# Patient Record
Sex: FEMALE | Race: WHITE | NOT HISPANIC OR LATINO | Employment: OTHER | URBAN - METROPOLITAN AREA
[De-identification: names, ages, dates, MRNs, and addresses within clinical notes are randomized per-mention and may not be internally consistent; named-entity substitution may affect disease eponyms.]

---

## 2015-12-04 LAB — BMD RECOMMENDATION EXT: NORMAL

## 2017-01-18 ENCOUNTER — HISTORICAL (OUTPATIENT)
Dept: ADMINISTRATIVE | Facility: HOSPITAL | Age: 72
End: 2017-01-18

## 2017-01-25 ENCOUNTER — HISTORICAL (OUTPATIENT)
Dept: ADMINISTRATIVE | Facility: HOSPITAL | Age: 72
End: 2017-01-25

## 2017-07-12 ENCOUNTER — HISTORICAL (OUTPATIENT)
Dept: ADMINISTRATIVE | Facility: HOSPITAL | Age: 72
End: 2017-07-12

## 2017-08-02 ENCOUNTER — HISTORICAL (OUTPATIENT)
Dept: ADMINISTRATIVE | Facility: HOSPITAL | Age: 72
End: 2017-08-02

## 2017-08-02 LAB
BUN SERPL-MCNC: 13 MG/DL (ref 7–18)
CALCIUM SERPL-MCNC: 9.2 MG/DL (ref 8.5–10.1)
CHLORIDE SERPL-SCNC: 107 MMOL/L (ref 98–107)
CHOLEST SERPL-MCNC: 205 MG/DL (ref 0–200)
CHOLEST/HDLC SERPL: 3.2 {RATIO} (ref 0–4)
CO2 SERPL-SCNC: 26 MMOL/L (ref 21–32)
CREAT SERPL-MCNC: 1.04 MG/DL (ref 0.55–1.02)
DEPRECATED CALCIDIOL+CALCIFEROL SERPL-MC: 26.66 NG/ML (ref 30–80)
GLUCOSE SERPL-MCNC: 99 MG/DL (ref 74–106)
HDLC SERPL-MCNC: 65 MG/DL (ref 35–60)
LDLC SERPL CALC-MCNC: 112 MG/DL (ref 0–129)
POTASSIUM SERPL-SCNC: 4.4 MMOL/L (ref 3.5–5.1)
SODIUM SERPL-SCNC: 142 MMOL/L (ref 136–145)
TRIGL SERPL-MCNC: 140 MG/DL (ref 30–150)
VLDLC SERPL CALC-MCNC: 28 MG/DL

## 2018-08-01 ENCOUNTER — HISTORICAL (OUTPATIENT)
Dept: ADMINISTRATIVE | Facility: HOSPITAL | Age: 73
End: 2018-08-01

## 2019-02-27 ENCOUNTER — HISTORICAL (OUTPATIENT)
Dept: RADIOLOGY | Facility: HOSPITAL | Age: 74
End: 2019-02-27

## 2019-03-01 ENCOUNTER — HISTORICAL (OUTPATIENT)
Dept: ADMINISTRATIVE | Facility: HOSPITAL | Age: 74
End: 2019-03-01

## 2019-08-07 ENCOUNTER — HISTORICAL (OUTPATIENT)
Dept: ADMINISTRATIVE | Facility: HOSPITAL | Age: 74
End: 2019-08-07

## 2019-08-07 LAB
ALBUMIN SERPL-MCNC: 3.6 GM/DL (ref 3.4–5)
ALBUMIN/GLOB SERPL: 1.2 {RATIO}
ALP SERPL-CCNC: 107 UNIT/L (ref 38–126)
ALT SERPL-CCNC: 17 UNIT/L (ref 12–78)
AST SERPL-CCNC: 23 UNIT/L (ref 15–37)
BILIRUB SERPL-MCNC: 0.7 MG/DL (ref 0.2–1)
BILIRUBIN DIRECT+TOT PNL SERPL-MCNC: 0.2 MG/DL (ref 0–0.2)
BILIRUBIN DIRECT+TOT PNL SERPL-MCNC: 0.5 MG/DL (ref 0–0.8)
BUN SERPL-MCNC: 13 MG/DL (ref 7–18)
CALCIUM SERPL-MCNC: 8.7 MG/DL (ref 8.5–10.1)
CHLORIDE SERPL-SCNC: 108 MMOL/L (ref 98–107)
CHOLEST SERPL-MCNC: 185 MG/DL (ref 0–200)
CHOLEST/HDLC SERPL: 2.7 {RATIO} (ref 0–4)
CO2 SERPL-SCNC: 27 MMOL/L (ref 21–32)
CREAT SERPL-MCNC: 1.05 MG/DL (ref 0.55–1.02)
DEPRECATED CALCIDIOL+CALCIFEROL SERPL-MC: 27.98 NG/ML (ref 30–80)
GLOBULIN SER-MCNC: 3.1 GM/DL (ref 2.4–3.5)
GLUCOSE SERPL-MCNC: 91 MG/DL (ref 74–106)
HDLC SERPL-MCNC: 69 MG/DL (ref 35–60)
LDLC SERPL CALC-MCNC: 92 MG/DL (ref 0–129)
POTASSIUM SERPL-SCNC: 4.2 MMOL/L (ref 3.5–5.1)
PROT SERPL-MCNC: 6.7 GM/DL (ref 6.4–8.2)
SODIUM SERPL-SCNC: 141 MMOL/L (ref 136–145)
TRIGL SERPL-MCNC: 120 MG/DL (ref 30–150)
VLDLC SERPL CALC-MCNC: 24 MG/DL

## 2019-11-29 ENCOUNTER — HISTORICAL (OUTPATIENT)
Dept: ADMINISTRATIVE | Facility: HOSPITAL | Age: 74
End: 2019-11-29

## 2019-11-29 LAB
TESTOST SERPL-MCNC: 51 NG/DL (ref 14–76)
TSH SERPL-ACNC: 2.19 MIU/L (ref 0.36–3.74)

## 2020-03-17 ENCOUNTER — HISTORICAL (OUTPATIENT)
Dept: LAB | Facility: HOSPITAL | Age: 75
End: 2020-03-17

## 2020-03-20 LAB — FINAL CULTURE: NORMAL

## 2020-03-26 ENCOUNTER — HISTORICAL (OUTPATIENT)
Dept: LAB | Facility: HOSPITAL | Age: 75
End: 2020-03-26

## 2020-03-29 LAB — FINAL CULTURE: NORMAL

## 2020-04-20 LAB — FINAL CULTURE: NORMAL

## 2020-05-20 ENCOUNTER — HISTORICAL (OUTPATIENT)
Dept: ADMINISTRATIVE | Facility: HOSPITAL | Age: 75
End: 2020-05-20

## 2020-06-02 ENCOUNTER — HISTORICAL (OUTPATIENT)
Dept: ADMINISTRATIVE | Facility: HOSPITAL | Age: 75
End: 2020-06-02

## 2020-07-06 ENCOUNTER — HISTORICAL (OUTPATIENT)
Dept: ADMINISTRATIVE | Facility: HOSPITAL | Age: 75
End: 2020-07-06

## 2020-07-09 LAB — FINAL CULTURE: NORMAL

## 2021-04-14 ENCOUNTER — HISTORICAL (OUTPATIENT)
Dept: ADMINISTRATIVE | Facility: HOSPITAL | Age: 76
End: 2021-04-14

## 2021-04-14 LAB
ALBUMIN SERPL-MCNC: 3.8 GM/DL (ref 3.4–4.8)
ALBUMIN/GLOB SERPL: 1.3 RATIO (ref 1.1–2)
ALP SERPL-CCNC: 144 UNIT/L (ref 40–150)
ALT SERPL-CCNC: 10 UNIT/L (ref 0–55)
AST SERPL-CCNC: 22 UNIT/L (ref 5–34)
BILIRUB SERPL-MCNC: 0.6 MG/DL
BILIRUBIN DIRECT+TOT PNL SERPL-MCNC: 0.2 MG/DL (ref 0–0.5)
BILIRUBIN DIRECT+TOT PNL SERPL-MCNC: 0.4 MG/DL (ref 0–0.8)
BUN SERPL-MCNC: 13.9 MG/DL (ref 9.8–20.1)
CALCIUM SERPL-MCNC: 9.1 MG/DL (ref 8.4–10.2)
CHLORIDE SERPL-SCNC: 107 MMOL/L (ref 98–107)
CHOLEST SERPL-MCNC: 205 MG/DL
CHOLEST/HDLC SERPL: 3 {RATIO} (ref 0–5)
CO2 SERPL-SCNC: 24 MMOL/L (ref 23–31)
CREAT SERPL-MCNC: 0.92 MG/DL (ref 0.55–1.02)
DEPRECATED CALCIDIOL+CALCIFEROL SERPL-MC: 38.5 NG/ML (ref 30–80)
GLOBULIN SER-MCNC: 2.9 GM/DL (ref 2.4–3.5)
GLUCOSE SERPL-MCNC: 109 MG/DL (ref 82–115)
HDLC SERPL-MCNC: 61 MG/DL (ref 35–60)
LDLC SERPL CALC-MCNC: 116 MG/DL (ref 50–140)
POTASSIUM SERPL-SCNC: 3.8 MMOL/L (ref 3.5–5.1)
PROT SERPL-MCNC: 6.7 GM/DL (ref 5.8–7.6)
SODIUM SERPL-SCNC: 143 MMOL/L (ref 136–145)
TRIGL SERPL-MCNC: 142 MG/DL (ref 37–140)
VLDLC SERPL CALC-MCNC: 28 MG/DL

## 2021-05-20 ENCOUNTER — HISTORICAL (OUTPATIENT)
Dept: ADMINISTRATIVE | Facility: HOSPITAL | Age: 76
End: 2021-05-20

## 2021-06-21 LAB — FINAL CULTURE: NORMAL

## 2021-07-15 ENCOUNTER — HISTORICAL (OUTPATIENT)
Dept: ADMINISTRATIVE | Facility: HOSPITAL | Age: 76
End: 2021-07-15

## 2021-07-15 LAB
ABS NEUT (OLG): 5.19 X10(3)/MCL (ref 2.1–9.2)
ALBUMIN SERPL-MCNC: 3.3 GM/DL (ref 3.4–4.8)
ALBUMIN/GLOB SERPL: 0.9 RATIO (ref 1.1–2)
ALP SERPL-CCNC: 118 UNIT/L (ref 40–150)
ALT SERPL-CCNC: 15 UNIT/L (ref 0–55)
AST SERPL-CCNC: 25 UNIT/L (ref 5–34)
BASOPHILS # BLD AUTO: 0.1 X10(3)/MCL (ref 0–0.2)
BASOPHILS NFR BLD AUTO: 1 %
BILIRUB SERPL-MCNC: 0.8 MG/DL
BILIRUBIN DIRECT+TOT PNL SERPL-MCNC: 0.3 MG/DL (ref 0–0.5)
BILIRUBIN DIRECT+TOT PNL SERPL-MCNC: 0.5 MG/DL (ref 0–0.8)
BUN SERPL-MCNC: 12 MG/DL (ref 9.8–20.1)
CALCIUM SERPL-MCNC: 9.1 MG/DL (ref 8.4–10.2)
CHLORIDE SERPL-SCNC: 104 MMOL/L (ref 98–107)
CK MB SERPL-MCNC: 1 NG/ML
CK SERPL-CCNC: 149 U/L (ref 29–168)
CO2 SERPL-SCNC: 23 MMOL/L (ref 23–31)
CREAT SERPL-MCNC: 0.88 MG/DL (ref 0.55–1.02)
D DIMER PPP IA.FEU-MCNC: 0.94 MCG/ML FEU (ref 0–0.5)
EOSINOPHIL # BLD AUTO: 0.1 X10(3)/MCL (ref 0–0.9)
EOSINOPHIL NFR BLD AUTO: 1 %
ERYTHROCYTE [DISTWIDTH] IN BLOOD BY AUTOMATED COUNT: 12.8 % (ref 11.5–17)
GLOBULIN SER-MCNC: 3.6 GM/DL (ref 2.4–3.5)
GLUCOSE SERPL-MCNC: 92 MG/DL (ref 82–115)
HCT VFR BLD AUTO: 43.5 % (ref 37–47)
HGB BLD-MCNC: 14.3 GM/DL (ref 12–16)
LYMPHOCYTES # BLD AUTO: 1.7 X10(3)/MCL (ref 0.6–4.6)
LYMPHOCYTES NFR BLD AUTO: 22 %
MAGNESIUM SERPL-MCNC: 2.2 MG/DL (ref 1.6–2.6)
MCH RBC QN AUTO: 32.6 PG (ref 27–31)
MCHC RBC AUTO-ENTMCNC: 32.9 GM/DL (ref 33–36)
MCV RBC AUTO: 99.1 FL (ref 80–94)
MONOCYTES # BLD AUTO: 0.5 X10(3)/MCL (ref 0.1–1.3)
MONOCYTES NFR BLD AUTO: 7 %
NEUTROPHILS # BLD AUTO: 5.19 X10(3)/MCL (ref 2.1–9.2)
NEUTROPHILS NFR BLD AUTO: 69 %
PLATELET # BLD AUTO: 300 X10(3)/MCL (ref 130–400)
PMV BLD AUTO: 11.5 FL (ref 9.4–12.4)
POTASSIUM SERPL-SCNC: 4.3 MMOL/L (ref 3.5–5.1)
PROT SERPL-MCNC: 6.9 GM/DL (ref 5.8–7.6)
RBC # BLD AUTO: 4.39 X10(6)/MCL (ref 4.2–5.4)
SODIUM SERPL-SCNC: 139 MMOL/L (ref 136–145)
TROPONIN I SERPL-MCNC: <0.01 NG/ML (ref 0–0.04)
TSH SERPL-ACNC: 2.03 UIU/ML (ref 0.35–4.94)
WBC # SPEC AUTO: 7.6 X10(3)/MCL (ref 4.5–11.5)

## 2021-07-16 ENCOUNTER — HISTORICAL (OUTPATIENT)
Dept: ADMINISTRATIVE | Facility: HOSPITAL | Age: 76
End: 2021-07-16

## 2021-08-23 ENCOUNTER — HISTORICAL (OUTPATIENT)
Dept: RADIOLOGY | Facility: HOSPITAL | Age: 76
End: 2021-08-23

## 2021-09-02 ENCOUNTER — HISTORICAL (OUTPATIENT)
Dept: ADMINISTRATIVE | Facility: HOSPITAL | Age: 76
End: 2021-09-02

## 2021-09-02 LAB
ABS NEUT (OLG): 7.47 X10(3)/MCL (ref 2.1–9.2)
ALBUMIN SERPL-MCNC: 3.6 GM/DL (ref 3.4–4.8)
ALBUMIN/GLOB SERPL: 1 RATIO (ref 1.1–2)
ALP SERPL-CCNC: 115 UNIT/L (ref 40–150)
ALT SERPL-CCNC: 10 UNIT/L (ref 0–55)
AST SERPL-CCNC: 19 UNIT/L (ref 5–34)
BASOPHILS # BLD AUTO: 0.1 X10(3)/MCL (ref 0–0.2)
BASOPHILS NFR BLD AUTO: 0 %
BILIRUB SERPL-MCNC: 0.7 MG/DL
BILIRUBIN DIRECT+TOT PNL SERPL-MCNC: 0.3 MG/DL (ref 0–0.5)
BILIRUBIN DIRECT+TOT PNL SERPL-MCNC: 0.4 MG/DL (ref 0–0.8)
BUN SERPL-MCNC: 11.8 MG/DL (ref 9.8–20.1)
CALCIUM SERPL-MCNC: 9.3 MG/DL (ref 8.4–10.2)
CHLORIDE SERPL-SCNC: 105 MMOL/L (ref 98–107)
CO2 SERPL-SCNC: 23 MMOL/L (ref 23–31)
CREAT SERPL-MCNC: 0.93 MG/DL (ref 0.55–1.02)
EOSINOPHIL # BLD AUTO: 0.1 X10(3)/MCL (ref 0–0.9)
EOSINOPHIL NFR BLD AUTO: 0 %
ERYTHROCYTE [DISTWIDTH] IN BLOOD BY AUTOMATED COUNT: 12.8 % (ref 11.5–17)
GLOBULIN SER-MCNC: 3.5 GM/DL (ref 2.4–3.5)
GLUCOSE SERPL-MCNC: 109 MG/DL (ref 82–115)
HCT VFR BLD AUTO: 45.1 % (ref 37–47)
HGB BLD-MCNC: 15.2 GM/DL (ref 12–16)
LYMPHOCYTES # BLD AUTO: 2.6 X10(3)/MCL (ref 0.6–4.6)
LYMPHOCYTES NFR BLD AUTO: 23 %
MCH RBC QN AUTO: 33.6 PG (ref 27–31)
MCHC RBC AUTO-ENTMCNC: 33.7 GM/DL (ref 33–36)
MCV RBC AUTO: 99.8 FL (ref 80–94)
MONOCYTES # BLD AUTO: 1 X10(3)/MCL (ref 0.1–1.3)
MONOCYTES NFR BLD AUTO: 9 %
NEUTROPHILS # BLD AUTO: 7.47 X10(3)/MCL (ref 2.1–9.2)
NEUTROPHILS NFR BLD AUTO: 66 %
PLATELET # BLD AUTO: 287 X10(3)/MCL (ref 130–400)
PMV BLD AUTO: 11.9 FL (ref 9.4–12.4)
POTASSIUM SERPL-SCNC: 4.4 MMOL/L (ref 3.5–5.1)
PROT SERPL-MCNC: 7.1 GM/DL (ref 5.8–7.6)
RBC # BLD AUTO: 4.52 X10(6)/MCL (ref 4.2–5.4)
SARS-COV-2 RNA RESP QL NAA+PROBE: NOT DETECTED
SODIUM SERPL-SCNC: 139 MMOL/L (ref 136–145)
WBC # SPEC AUTO: 11.3 X10(3)/MCL (ref 4.5–11.5)

## 2021-12-21 ENCOUNTER — HISTORICAL (OUTPATIENT)
Dept: ADMINISTRATIVE | Facility: HOSPITAL | Age: 76
End: 2021-12-21

## 2021-12-21 LAB
ABS NEUT (OLG): 6.75 X10(3)/MCL (ref 2.1–9.2)
ALBUMIN SERPL-MCNC: 3.4 GM/DL (ref 3.4–4.8)
ALP SERPL-CCNC: 109 UNIT/L (ref 40–150)
ALT SERPL-CCNC: 16 UNIT/L (ref 0–55)
AST SERPL-CCNC: 23 UNIT/L (ref 5–34)
BASOPHILS # BLD AUTO: 0 X10(3)/MCL (ref 0–0.2)
BASOPHILS NFR BLD AUTO: 1 %
BILIRUB SERPL-MCNC: 0.7 MG/DL
BILIRUBIN DIRECT+TOT PNL SERPL-MCNC: 0.3 MG/DL (ref 0–0.5)
BILIRUBIN DIRECT+TOT PNL SERPL-MCNC: 0.4 MG/DL (ref 0–0.8)
EOSINOPHIL # BLD AUTO: 0.1 X10(3)/MCL (ref 0–0.9)
EOSINOPHIL NFR BLD AUTO: 1 %
ERYTHROCYTE [DISTWIDTH] IN BLOOD BY AUTOMATED COUNT: 13.3 % (ref 11.5–17)
HBV CORE IGM SERPL QL IA: NONREACTIVE
HBV SURFACE AB SER-ACNC: 0.31 MIU/ML
HBV SURFACE AB SERPL IA-ACNC: NONREACTIVE M[IU]/ML
HBV SURFACE AG SERPL QL IA: NONREACTIVE
HCT VFR BLD AUTO: 46.6 % (ref 37–47)
HCV AB SERPL QL IA: NONREACTIVE
HGB BLD-MCNC: 15.4 GM/DL (ref 12–16)
LIVER PROFILE INTERP: NORMAL
LYMPHOCYTES # BLD AUTO: 1.3 X10(3)/MCL (ref 0.6–4.6)
LYMPHOCYTES NFR BLD AUTO: 15 %
MCH RBC QN AUTO: 32.4 PG (ref 27–31)
MCHC RBC AUTO-ENTMCNC: 33 GM/DL (ref 33–36)
MCV RBC AUTO: 97.9 FL (ref 80–94)
MONOCYTES # BLD AUTO: 0.7 X10(3)/MCL (ref 0.1–1.3)
MONOCYTES NFR BLD AUTO: 8 %
NEUTROPHILS # BLD AUTO: 6.75 X10(3)/MCL (ref 2.1–9.2)
NEUTROPHILS NFR BLD AUTO: 75 %
PLATELET # BLD AUTO: 364 X10(3)/MCL (ref 130–400)
PMV BLD AUTO: 10.9 FL (ref 9.4–12.4)
PROT SERPL-MCNC: 7.4 GM/DL (ref 5.8–7.6)
RBC # BLD AUTO: 4.76 X10(6)/MCL (ref 4.2–5.4)
WBC # SPEC AUTO: 9 X10(3)/MCL (ref 4.5–11.5)

## 2021-12-29 ENCOUNTER — HISTORICAL (OUTPATIENT)
Dept: ADMINISTRATIVE | Facility: HOSPITAL | Age: 76
End: 2021-12-29

## 2021-12-30 LAB — BCS RECOMMENDATION EXT: NORMAL

## 2022-04-09 ENCOUNTER — HISTORICAL (OUTPATIENT)
Dept: ADMINISTRATIVE | Facility: HOSPITAL | Age: 77
End: 2022-04-09
Payer: MEDICARE

## 2022-04-18 ENCOUNTER — HISTORICAL (OUTPATIENT)
Dept: ADMINISTRATIVE | Facility: HOSPITAL | Age: 77
End: 2022-04-18
Payer: MEDICARE

## 2022-04-18 LAB
ABS NEUT (OLG): 4.33 (ref 2.1–9.2)
ALBUMIN SERPL-MCNC: 3.5 G/DL (ref 3.4–4.8)
ALBUMIN/GLOB SERPL: 1.2 {RATIO} (ref 1.1–2)
ALP SERPL-CCNC: 106 U/L (ref 40–150)
ALT SERPL-CCNC: 14 U/L (ref 0–55)
AST SERPL-CCNC: 20 U/L (ref 5–34)
BASOPHILS # BLD AUTO: 0 10*3/UL (ref 0–0.2)
BASOPHILS NFR BLD AUTO: 0 %
BILIRUB SERPL-MCNC: 0.8 MG/DL
BILIRUBIN DIRECT+TOT PNL SERPL-MCNC: 0.3 (ref 0–0.5)
BILIRUBIN DIRECT+TOT PNL SERPL-MCNC: 0.5 (ref 0–0.8)
BUN SERPL-MCNC: 10.4 MG/DL (ref 9.8–20.1)
CALCIUM SERPL-MCNC: 9.3 MG/DL (ref 8.7–10.5)
CHLORIDE SERPL-SCNC: 108 MMOL/L (ref 98–107)
CHOLEST SERPL-MCNC: 231 MG/DL
CHOLEST/HDLC SERPL: 4 {RATIO} (ref 0–5)
CO2 SERPL-SCNC: 27 MMOL/L (ref 23–31)
CREAT SERPL-MCNC: 0.94 MG/DL (ref 0.55–1.02)
DEPRECATED CALCIDIOL+CALCIFEROL SERPL-MC: 42.5 NG/ML (ref 30–80)
EOSINOPHIL # BLD AUTO: 0.2 10*3/UL (ref 0–0.9)
EOSINOPHIL NFR BLD AUTO: 2 %
ERYTHROCYTE [DISTWIDTH] IN BLOOD BY AUTOMATED COUNT: 13.9 % (ref 11.5–17)
GLOBULIN SER-MCNC: 2.9 G/DL (ref 2.4–3.5)
GLUCOSE SERPL-MCNC: 96 MG/DL (ref 82–115)
HCT VFR BLD AUTO: 44.7 % (ref 37–47)
HDLC SERPL-MCNC: 65 MG/DL (ref 35–60)
HEMOLYSIS INTERF INDEX SERPL-ACNC: 2
HGB BLD-MCNC: 14.9 G/DL (ref 12–16)
ICTERIC INTERF INDEX SERPL-ACNC: 1
IMM GRANULOCYTES # BLD AUTO: 0.02 10*3/UL
IMM GRANULOCYTES NFR BLD AUTO: 0 %
LDLC SERPL CALC-MCNC: 142 MG/DL (ref 50–140)
LIPEMIC INTERF INDEX SERPL-ACNC: 1
LYMPHOCYTES # BLD AUTO: 2.3 10*3/UL (ref 0.6–4.6)
LYMPHOCYTES NFR BLD AUTO: 32 %
MANUAL DIFF? (OHS): NO
MCH RBC QN AUTO: 33.8 PG (ref 27–31)
MCHC RBC AUTO-ENTMCNC: 33.3 G/DL (ref 33–36)
MCV RBC AUTO: 101.4 FL (ref 80–94)
MONOCYTES # BLD AUTO: 0.5 10*3/UL (ref 0.1–1.3)
MONOCYTES NFR BLD AUTO: 6 %
NEUTROPHILS # BLD AUTO: 4.33 10*3/UL (ref 2.1–9.2)
NEUTROPHILS NFR BLD AUTO: 59 %
PLATELET # BLD AUTO: 230 10*3/UL (ref 130–400)
PMV BLD AUTO: 11.2 FL (ref 9.4–12.4)
POTASSIUM SERPL-SCNC: 4.4 MMOL/L (ref 3.5–5.1)
PROT SERPL-MCNC: 6.4 G/DL (ref 5.8–7.6)
RBC # BLD AUTO: 4.41 10*6/UL (ref 4.2–5.4)
SODIUM SERPL-SCNC: 145 MMOL/L (ref 136–145)
TRIGL SERPL-MCNC: 122 MG/DL (ref 37–140)
VLDLC SERPL CALC-MCNC: 24 MG/DL
WBC # SPEC AUTO: 7.4 10*3/UL (ref 4.5–11.5)

## 2022-04-25 VITALS
DIASTOLIC BLOOD PRESSURE: 56 MMHG | OXYGEN SATURATION: 98 % | SYSTOLIC BLOOD PRESSURE: 118 MMHG | BODY MASS INDEX: 32.76 KG/M2 | WEIGHT: 178 LBS | HEIGHT: 62 IN

## 2022-05-17 ENCOUNTER — TELEPHONE (OUTPATIENT)
Dept: INTERNAL MEDICINE | Facility: CLINIC | Age: 77
End: 2022-05-17
Payer: MEDICARE

## 2022-05-17 NOTE — TELEPHONE ENCOUNTER
----- Message from Cheryl Lejeune sent at 5/17/2022 11:39 AM CDT -----  Regarding: RE: helen NANCE, 5/23 @100  Pt informed of OV and check in protocol.  She verbalized understanding.  ----- Message -----  From: Eleonora Medellin LPN  Sent: 5/16/2022   4:21 PM CDT  To: Shelley Lejeune  Subject: helen NANCE, 5/23 @100                              1. Are there any outstanding tasks in the patient's chart? no    2. Is there any documentation of task in the chart? no    3. Has patient been seen in an ER, urgent care clinic, or been admitted since last visit?    4. Has patient seen any other healthcare providers (doctors) since last visit?    5. Has patient had any bloodwork or x-rays done since last vist?

## 2022-05-20 PROBLEM — K63.5 POLYP OF COLON: Status: ACTIVE | Noted: 2022-05-20

## 2022-05-20 PROBLEM — E55.9 VITAMIN D DEFICIENCY: Status: ACTIVE | Noted: 2022-05-20

## 2022-05-20 PROBLEM — E66.9 OBESITY: Status: ACTIVE | Noted: 2022-05-20

## 2022-05-20 PROBLEM — C50.919 MALIGNANT NEOPLASM OF BREAST: Status: ACTIVE | Noted: 2022-05-20

## 2022-05-20 PROBLEM — M81.0 OSTEOPOROSIS: Status: ACTIVE | Noted: 2022-05-20

## 2022-05-20 PROBLEM — I10 HYPERTENSION: Status: ACTIVE | Noted: 2022-05-20

## 2022-05-23 ENCOUNTER — OFFICE VISIT (OUTPATIENT)
Dept: INTERNAL MEDICINE | Facility: CLINIC | Age: 77
End: 2022-05-23
Payer: MEDICARE

## 2022-05-23 VITALS
HEART RATE: 99 BPM | OXYGEN SATURATION: 98 % | TEMPERATURE: 98 F | DIASTOLIC BLOOD PRESSURE: 72 MMHG | RESPIRATION RATE: 18 BRPM | HEIGHT: 61 IN | WEIGHT: 176 LBS | BODY MASS INDEX: 33.23 KG/M2 | SYSTOLIC BLOOD PRESSURE: 122 MMHG

## 2022-05-23 DIAGNOSIS — I10 PRIMARY HYPERTENSION: Primary | ICD-10-CM

## 2022-05-23 PROCEDURE — 99213 PR OFFICE/OUTPT VISIT, EST, LEVL III, 20-29 MIN: ICD-10-PCS | Mod: ,,, | Performed by: INTERNAL MEDICINE

## 2022-05-23 PROCEDURE — 99213 OFFICE O/P EST LOW 20 MIN: CPT | Mod: ,,, | Performed by: INTERNAL MEDICINE

## 2022-05-23 RX ORDER — LISINOPRIL 10 MG/1
10 TABLET ORAL DAILY
COMMUNITY
Start: 2022-04-20 | End: 2022-08-15

## 2022-05-23 RX ORDER — USTEKINUMAB 45 MG/.5ML
INJECTION, SOLUTION SUBCUTANEOUS
COMMUNITY
Start: 2022-03-07

## 2022-05-23 NOTE — PROGRESS NOTES
Subjective:      Chief Complaint: 1 mo f/u      HPI:  She is here for f/u htn.  BP is usually 117-130/70-90, but rarely.  Diastolic is usually in the 70s.  She is tolerating the lisinopril without issue.  She has occ palpitations, but nothing unusual and its brief.    She is on the Stelara, which is keeping the rash under control.    Problem Noted   Hypertension 5/20/2022   Breast Cancer 5/20/2022   Obesity 5/20/2022   Osteoporosis 5/20/2022    had bone density 12/2015 that showed osteoporosis but patient opted not to treathad seen Dr. Pabon and was on injections for the osteoporosis in the past.  She thinks the last bone density was in 2012.     Colon Polyps 5/20/2022   Vitamin D Deficiency 5/20/2022        The patient's Health Maintenance was reviewed and the following appears to be due:   Health Maintenance Due   Topic Date Due    COVID-19 Vaccine (1) Never done    Pneumococcal Vaccines (Age 65+) (1 - PCV) Never done    Shingles Vaccine (1 of 2) Never done    DEXA Scan  Never done       Past Medical History:  History reviewed. No pertinent past medical history.  Past Surgical History:   Procedure Laterality Date    BREAST LUMPECTOMY      KNEE ARTHROSCOPY      LIPOMA RESECTION       Review of patient's allergies indicates:   Allergen Reactions    Hydrochlorothiazide Rash    Ketoconazole Rash     Current Outpatient Medications on File Prior to Visit   Medication Sig Dispense Refill    lisinopriL 10 MG tablet Take 10 mg by mouth once daily.      STELARA 45 mg/0.5 mL Syrg syringe every 3 (three) months.       No current facility-administered medications on file prior to visit.     Social History     Socioeconomic History    Marital status:    Tobacco Use    Smoking status: Never Smoker    Smokeless tobacco: Never Used   Substance and Sexual Activity    Alcohol use: Not Currently    Drug use: Never    Sexual activity: Not Currently     Family History   Problem Relation Age of Onset    Liver  "cancer Mother     Heart disease Father     Hypertension Sister        Review of Systems    Objective:   /72 (BP Location: Right arm, Patient Position: Sitting)   Pulse 99   Temp 98.2 °F (36.8 °C)   Resp 18   Ht 5' 1" (1.549 m)   Wt 79.8 kg (176 lb)   SpO2 98%   BMI 33.25 kg/m²     Physical Exam  Vitals reviewed.   Constitutional:       General: She is not in acute distress.     Appearance: Normal appearance. She is not ill-appearing or diaphoretic.   HENT:      Head: Normocephalic and atraumatic.   Pulmonary:      Effort: Pulmonary effort is normal.   Skin:     General: Skin is warm and dry.   Neurological:      General: No focal deficit present.      Mental Status: She is alert.   Psychiatric:         Mood and Affect: Mood normal.         Behavior: Behavior normal.         Thought Content: Thought content normal.         Judgment: Judgment normal.       Assessment and Plan:   1. Primary hypertension  Controlled.  Cont lisinopril.        "

## 2022-11-14 ENCOUNTER — LAB VISIT (OUTPATIENT)
Dept: LAB | Facility: HOSPITAL | Age: 77
End: 2022-11-14
Attending: PHYSICIAN ASSISTANT
Payer: MEDICARE

## 2022-11-14 DIAGNOSIS — Z79.899 ENCOUNTER FOR LONG-TERM (CURRENT) USE OF OTHER MEDICATIONS: Primary | ICD-10-CM

## 2022-11-14 LAB
ALBUMIN SERPL-MCNC: 3.7 GM/DL (ref 3.4–4.8)
ALBUMIN/GLOB SERPL: 1.3 RATIO (ref 1.1–2)
ALP SERPL-CCNC: 103 UNIT/L (ref 40–150)
ALT SERPL-CCNC: 12 UNIT/L (ref 0–55)
AST SERPL-CCNC: 21 UNIT/L (ref 5–34)
BASOPHILS # BLD AUTO: 0.05 X10(3)/MCL (ref 0–0.2)
BASOPHILS NFR BLD AUTO: 0.5 %
BILIRUBIN DIRECT+TOT PNL SERPL-MCNC: 0.4 MG/DL
BUN SERPL-MCNC: 10.9 MG/DL (ref 9.8–20.1)
CALCIUM SERPL-MCNC: 9.3 MG/DL (ref 8.4–10.2)
CHLORIDE SERPL-SCNC: 105 MMOL/L (ref 98–107)
CO2 SERPL-SCNC: 26 MMOL/L (ref 23–31)
CREAT SERPL-MCNC: 1.18 MG/DL (ref 0.55–1.02)
EOSINOPHIL # BLD AUTO: 0.03 X10(3)/MCL (ref 0–0.9)
EOSINOPHIL NFR BLD AUTO: 0.3 %
ERYTHROCYTE [DISTWIDTH] IN BLOOD BY AUTOMATED COUNT: 13 % (ref 11.5–17)
GFR SERPLBLD CREATININE-BSD FMLA CKD-EPI: 47 MLS/MIN/1.73/M2
GLOBULIN SER-MCNC: 2.9 GM/DL (ref 2.4–3.5)
GLUCOSE SERPL-MCNC: 120 MG/DL (ref 82–115)
HCT VFR BLD AUTO: 45.3 % (ref 37–47)
HGB BLD-MCNC: 14.9 GM/DL (ref 12–16)
IMM GRANULOCYTES # BLD AUTO: 0.04 X10(3)/MCL (ref 0–0.04)
IMM GRANULOCYTES NFR BLD AUTO: 0.4 %
LYMPHOCYTES # BLD AUTO: 1.42 X10(3)/MCL (ref 0.6–4.6)
LYMPHOCYTES NFR BLD AUTO: 15.4 %
MCH RBC QN AUTO: 33.3 PG (ref 27–31)
MCHC RBC AUTO-ENTMCNC: 32.9 MG/DL (ref 33–36)
MCV RBC AUTO: 101.1 FL (ref 80–94)
MONOCYTES # BLD AUTO: 0.49 X10(3)/MCL (ref 0.1–1.3)
MONOCYTES NFR BLD AUTO: 5.3 %
NEUTROPHILS # BLD AUTO: 7.2 X10(3)/MCL (ref 2.1–9.2)
NEUTROPHILS NFR BLD AUTO: 78.1 %
NRBC BLD AUTO-RTO: 0 %
PLATELET # BLD AUTO: 262 X10(3)/MCL (ref 130–400)
PMV BLD AUTO: 11.4 FL (ref 7.4–10.4)
POTASSIUM SERPL-SCNC: 4 MMOL/L (ref 3.5–5.1)
PROT SERPL-MCNC: 6.6 GM/DL (ref 5.8–7.6)
RBC # BLD AUTO: 4.48 X10(6)/MCL (ref 4.2–5.4)
SODIUM SERPL-SCNC: 139 MMOL/L (ref 136–145)
WBC # SPEC AUTO: 9.3 X10(3)/MCL (ref 4.5–11.5)

## 2022-11-14 PROCEDURE — 85025 COMPLETE CBC W/AUTO DIFF WBC: CPT

## 2022-11-14 PROCEDURE — 80053 COMPREHEN METABOLIC PANEL: CPT

## 2022-11-14 PROCEDURE — 86480 TB TEST CELL IMMUN MEASURE: CPT

## 2022-11-14 PROCEDURE — 36415 COLL VENOUS BLD VENIPUNCTURE: CPT

## 2022-11-15 ENCOUNTER — DOCUMENTATION ONLY (OUTPATIENT)
Dept: INTERNAL MEDICINE | Facility: CLINIC | Age: 77
End: 2022-11-15
Payer: MEDICARE

## 2022-11-15 ENCOUNTER — TELEPHONE (OUTPATIENT)
Dept: INTERNAL MEDICINE | Facility: CLINIC | Age: 77
End: 2022-11-15
Payer: MEDICARE

## 2022-11-15 NOTE — TELEPHONE ENCOUNTER
----- Message from Belle Salazar LPN sent at 11/15/2022  1:47 PM CST -----  Regarding: GODWIN Manrique 11/22/22 @10:40a  Are there any outstanding tasks in the chart? no    Is there any documentation of tasks? no    Has patient seen another physician, been to the ER, UCC, or admitted to hospital since last visit?    Has the patient done blood work or imaging since last visit?

## 2022-11-16 LAB
GAMMA INTERFERON BACKGROUND BLD IA-ACNC: 0.02 IU/ML
M TB IFN-G BLD-IMP: NEGATIVE
M TB IFN-G CD4+ BCKGRND COR BLD-ACNC: 0 IU/ML
M TB IFN-G CD4+CD8+ BCKGRND COR BLD-ACNC: 0.01 IU/ML
MITOGEN IGNF BCKGRD COR BLD-ACNC: 9.41 IU/ML

## 2022-12-14 ENCOUNTER — PATIENT OUTREACH (OUTPATIENT)
Dept: ADMINISTRATIVE | Facility: CLINIC | Age: 77
End: 2022-12-14
Payer: MEDICARE

## 2022-12-14 NOTE — PROGRESS NOTES
C3 nurse attempted to contact patient for a TCC post hospital discharge follow-up call. The patient declined call at this time.

## 2022-12-27 ENCOUNTER — TELEPHONE (OUTPATIENT)
Dept: INTERNAL MEDICINE | Facility: CLINIC | Age: 77
End: 2022-12-27
Payer: MEDICARE

## 2022-12-27 DIAGNOSIS — Z12.31 ENCOUNTER FOR SCREENING MAMMOGRAM FOR BREAST CANCER: Primary | ICD-10-CM

## 2022-12-27 NOTE — TELEPHONE ENCOUNTER
MMG order entered and faxed to [facility]. Pt notified and verbalized understanding.

## 2023-01-03 LAB — BCS RECOMMENDATION EXT: NORMAL

## 2023-01-05 ENCOUNTER — DOCUMENTATION ONLY (OUTPATIENT)
Dept: INTERNAL MEDICINE | Facility: CLINIC | Age: 78
End: 2023-01-05
Payer: MEDICARE

## 2023-01-19 ENCOUNTER — OFFICE VISIT (OUTPATIENT)
Dept: INTERNAL MEDICINE | Facility: CLINIC | Age: 78
End: 2023-01-19
Payer: MEDICARE

## 2023-01-19 ENCOUNTER — HOSPITAL ENCOUNTER (OUTPATIENT)
Dept: RADIOLOGY | Facility: HOSPITAL | Age: 78
Discharge: HOME | End: 2023-01-19
Attending: INTERNAL MEDICINE
Payer: MEDICARE

## 2023-01-19 VITALS
OXYGEN SATURATION: 98 % | HEART RATE: 101 BPM | DIASTOLIC BLOOD PRESSURE: 82 MMHG | WEIGHT: 174 LBS | TEMPERATURE: 99 F | RESPIRATION RATE: 20 BRPM | SYSTOLIC BLOOD PRESSURE: 122 MMHG | BODY MASS INDEX: 32.85 KG/M2 | HEIGHT: 61 IN

## 2023-01-19 DIAGNOSIS — N30.01 ACUTE CYSTITIS WITH HEMATURIA: ICD-10-CM

## 2023-01-19 DIAGNOSIS — R09.89 RALES 1/2 WAY UP POSTERIOR CHEST WALL ON RIGHT SIDE: ICD-10-CM

## 2023-01-19 DIAGNOSIS — R07.81 PLEURITIC CHEST PAIN: ICD-10-CM

## 2023-01-19 DIAGNOSIS — R07.81 PLEURITIC CHEST PAIN: Primary | ICD-10-CM

## 2023-01-19 PROBLEM — K35.30 ACUTE APPENDICITIS WITH LOCALIZED PERITONITIS, WITHOUT PERFORATION, ABSCESS, OR GANGRENE: Status: ACTIVE | Noted: 2022-12-10

## 2023-01-19 PROBLEM — N39.0 URINARY TRACT INFECTION WITH HEMATURIA: Status: ACTIVE | Noted: 2023-01-13

## 2023-01-19 PROBLEM — R31.9 URINARY TRACT INFECTION WITH HEMATURIA: Status: ACTIVE | Noted: 2023-01-13

## 2023-01-19 PROCEDURE — 71046 X-RAY EXAM CHEST 2 VIEWS: CPT | Mod: TC

## 2023-01-19 PROCEDURE — 99214 PR OFFICE/OUTPT VISIT, EST, LEVL IV, 30-39 MIN: ICD-10-PCS | Mod: ,,, | Performed by: INTERNAL MEDICINE

## 2023-01-19 PROCEDURE — 99214 OFFICE O/P EST MOD 30 MIN: CPT | Mod: ,,, | Performed by: INTERNAL MEDICINE

## 2023-01-19 NOTE — PROGRESS NOTES
Subjective:      Chief Complaint: Shortness of Breath (Says she can hardly breathe, this feels like something she had a year ago- had nodule on L lung.Is having pain on R side right now. Started 3-4days ago /Had appendectomy on 12/10)      HPI:She is here with c/o trouble breathing.  She had an appendectomy done on December 11th at [facility].  She was discharged the following day.  She has some pain at the base of her rt lung posteriorly and rotates around to the RUQ.  It started 4-5 days ago.  Its worse with moving.  On Jan. 13th, she went to urgent care and was treated for a UTI.  She finishes the abx today.  She is taking nitrofurantoin that was sensitive to what grew in the culture.  She was having some pain and burning with urination.  Sx are better but still doesn't feel right.  No vomiting but she does have some nausea.  No fevers thought she has some cold intolerance.  She has sob when she talks.  She has a sl cough if she moves.  No leg swelling.  There is some pleuritic pain with deep breath.  She describes the pain as a soreness.    Problem Noted   Pleuritic Chest Pain 1/19/2023   Urinary Tract Infection With Hematuria 1/13/2023   Acute Appendicitis With Localized Peritonitis, Without Perforation, Abscess, Or Gangrene 12/10/2022    Formatting of this note might be different from the original.  Added automatically from request for surgery [id number]    Last Assessment & Plan:   Formatting of this note might be different from the original.  1.5 weeks s/p lap appy    - pathology reviewed- c/w diagnosis  - RUE ultrasound ordered for arm swelling  - will call with results  - RTC prn     Hypertension 5/20/2022   Breast Cancer 5/20/2022   Obesity 5/20/2022   Osteoporosis 5/20/2022    had bone density 12/2015 that showed osteoporosis but patient opted not to treathad seen Dr. Pabon and was on injections for the osteoporosis in the past.  She thinks the last bone density was in 2012.     Colon Polyps 5/20/2022  "  Vitamin D Deficiency 5/20/2022        The patient's Health Maintenance was reviewed and the following appears to be due:   Health Maintenance Due   Topic Date Due    COVID-19 Vaccine (1) Never done    Pneumococcal Vaccines (Age 65+) (1 - PCV) Never done    Shingles Vaccine (1 of 2) Never done    DEXA Scan  12/04/2018    Influenza Vaccine (1) Never done       Past Medical History:  History reviewed. No pertinent past medical history.  Review of patient's allergies indicates:   Allergen Reactions    Hydrochlorothiazide Rash    Ketoconazole Rash     Current Outpatient Medications on File Prior to Visit   Medication Sig Dispense Refill    lisinopriL 10 MG tablet TAKE 1 TABLET BY MOUTH EVERY DAY 90 tablet 1    STELARA 45 mg/0.5 mL Syrg syringe every 3 (three) months.       No current facility-administered medications on file prior to visit.       Review of Systems    Objective:   /82 (BP Location: Right arm, Patient Position: Sitting, BP Method: Large (Manual))   Pulse 101   Temp 98.9 °F (37.2 °C)   Resp 20   Ht 5' 0.98" (1.549 m)   Wt 78.9 kg (174 lb)   SpO2 98%   BMI 32.89 kg/m²     Physical Exam  Vitals reviewed.   Constitutional:       General: She is not in acute distress.     Appearance: Normal appearance. She is not ill-appearing or diaphoretic.   HENT:      Head: Normocephalic and atraumatic.   Cardiovascular:      Rate and Rhythm: Normal rate and regular rhythm.      Heart sounds: Normal heart sounds.   Pulmonary:      Effort: Pulmonary effort is normal.      Breath sounds: Rales (rt base) present.   Musculoskeletal:      Comments: Some flank ttp   Skin:     General: Skin is warm and dry.   Neurological:      General: No focal deficit present.      Mental Status: She is alert.   Psychiatric:         Mood and Affect: Mood normal.         Behavior: Behavior normal.         Thought Content: Thought content normal.         Judgment: Judgment normal.     Assessment and Plan:     Pleuritic chest " pain  Sounds like she could have a pneumonia of the RLL.  Will get labs and cxr to further evaluate.    Urinary tract infection with hematuria  Repeat u/a      No follow-ups on file.       [unfilled]  Orders Placed This Encounter   Procedures    X-Ray Chest PA And Lateral     Standing Status:   Future     Standing Expiration Date:   1/19/2024     Order Specific Question:   May the Radiologist modify the order per protocol to meet the clinical needs of the patient?     Answer:   Yes     Order Specific Question:   Release to patient     Answer:   Immediate    CBC Auto Differential     Standing Status:   Future     Standing Expiration Date:   3/19/2024    Comprehensive Metabolic Panel     Standing Status:   Future     Standing Expiration Date:   3/19/2024    Urinalysis, Reflex to Urine Culture     Standing Status:   Future     Standing Expiration Date:   2/19/2023     Order Specific Question:   Specimen Source     Answer:   Urine    D-Dimer, Quantitative     Standing Status:   Future     Standing Expiration Date:   3/19/2024

## 2023-01-20 ENCOUNTER — TELEPHONE (OUTPATIENT)
Dept: INTERNAL MEDICINE | Facility: CLINIC | Age: 78
End: 2023-01-20
Payer: MEDICARE

## 2023-01-20 ENCOUNTER — HOSPITAL ENCOUNTER (OUTPATIENT)
Facility: HOSPITAL | Age: 78
Discharge: HOME | End: 2023-01-21
Attending: EMERGENCY MEDICINE | Admitting: INTERNAL MEDICINE
Payer: MEDICARE

## 2023-01-20 DIAGNOSIS — I26.99 PULMONARY EMBOLISM AND INFARCTION: ICD-10-CM

## 2023-01-20 DIAGNOSIS — I10 BENIGN ESSENTIAL HTN: Primary | ICD-10-CM

## 2023-01-20 DIAGNOSIS — R06.02 SOB (SHORTNESS OF BREATH): ICD-10-CM

## 2023-01-20 DIAGNOSIS — I26.99 PULMONARY EMBOLISM: ICD-10-CM

## 2023-01-20 DIAGNOSIS — R07.81 PLEURITIC CHEST PAIN: ICD-10-CM

## 2023-01-20 DIAGNOSIS — R07.81 PLEURODYNIA: Primary | ICD-10-CM

## 2023-01-20 PROBLEM — E87.1 HYPONATREMIA: Status: ACTIVE | Noted: 2023-01-20

## 2023-01-20 LAB
ALBUMIN SERPL-MCNC: 3.2 G/DL (ref 3.4–4.8)
ALBUMIN/GLOB SERPL: 0.8 RATIO (ref 1.1–2)
ALP SERPL-CCNC: 114 UNIT/L (ref 40–150)
ALT SERPL-CCNC: 21 UNIT/L (ref 0–55)
AST SERPL-CCNC: 30 UNIT/L (ref 5–34)
BASOPHILS # BLD AUTO: 0.03 X10(3)/MCL (ref 0–0.2)
BASOPHILS NFR BLD AUTO: 0.3 %
BILIRUBIN DIRECT+TOT PNL SERPL-MCNC: 1.1 MG/DL
BNP BLD-MCNC: 20.1 PG/ML
BUN SERPL-MCNC: 17.5 MG/DL (ref 9.8–20.1)
CALCIUM SERPL-MCNC: 9.2 MG/DL (ref 8.4–10.2)
CHLORIDE SERPL-SCNC: 101 MMOL/L (ref 98–107)
CO2 SERPL-SCNC: 17 MMOL/L (ref 23–31)
CREAT SERPL-MCNC: 1.14 MG/DL (ref 0.55–1.02)
EOSINOPHIL # BLD AUTO: 0.01 X10(3)/MCL (ref 0–0.9)
EOSINOPHIL NFR BLD AUTO: 0.1 %
ERYTHROCYTE [DISTWIDTH] IN BLOOD BY AUTOMATED COUNT: 13 % (ref 11.5–17)
GFR SERPLBLD CREATININE-BSD FMLA CKD-EPI: 49 MLS/MIN/1.73/M2
GLOBULIN SER-MCNC: 3.9 GM/DL (ref 2.4–3.5)
GLUCOSE SERPL-MCNC: 124 MG/DL (ref 82–115)
HCT VFR BLD AUTO: 38.5 % (ref 37–47)
HGB BLD-MCNC: 13.1 GM/DL (ref 12–16)
IMM GRANULOCYTES # BLD AUTO: 0.04 X10(3)/MCL (ref 0–0.04)
IMM GRANULOCYTES NFR BLD AUTO: 0.4 %
INR BLD: 1.2 (ref 0–1.3)
LYMPHOCYTES # BLD AUTO: 1.11 X10(3)/MCL (ref 0.6–4.6)
LYMPHOCYTES NFR BLD AUTO: 10 %
MCH RBC QN AUTO: 32.8 PG
MCHC RBC AUTO-ENTMCNC: 34 MG/DL (ref 33–36)
MCV RBC AUTO: 96.5 FL (ref 80–94)
MONOCYTES # BLD AUTO: 1.24 X10(3)/MCL (ref 0.1–1.3)
MONOCYTES NFR BLD AUTO: 11.2 %
NEUTROPHILS # BLD AUTO: 8.68 X10(3)/MCL (ref 2.1–9.2)
NEUTROPHILS NFR BLD AUTO: 78 %
NRBC BLD AUTO-RTO: 0 %
PLATELET # BLD AUTO: 179 X10(3)/MCL (ref 130–400)
PMV BLD AUTO: 11.6 FL (ref 7.4–10.4)
POTASSIUM SERPL-SCNC: 4.2 MMOL/L (ref 3.5–5.1)
PROT SERPL-MCNC: 7.1 GM/DL (ref 5.8–7.6)
PROTHROMBIN TIME: 15.1 SECONDS (ref 12.5–14.5)
RBC # BLD AUTO: 3.99 X10(6)/MCL (ref 4.2–5.4)
SODIUM SERPL-SCNC: 130 MMOL/L (ref 136–145)
TROPONIN I SERPL-MCNC: <0.01 NG/ML (ref 0–0.04)
WBC # SPEC AUTO: 11.1 X10(3)/MCL (ref 4.5–11.5)

## 2023-01-20 PROCEDURE — 83880 ASSAY OF NATRIURETIC PEPTIDE: CPT | Performed by: NURSE PRACTITIONER

## 2023-01-20 PROCEDURE — 25000003 PHARM REV CODE 250: Performed by: EMERGENCY MEDICINE

## 2023-01-20 PROCEDURE — 93005 ELECTROCARDIOGRAM TRACING: CPT

## 2023-01-20 PROCEDURE — 96372 THER/PROPH/DIAG INJ SC/IM: CPT | Performed by: EMERGENCY MEDICINE

## 2023-01-20 PROCEDURE — G0378 HOSPITAL OBSERVATION PER HR: HCPCS

## 2023-01-20 PROCEDURE — 85610 PROTHROMBIN TIME: CPT | Performed by: NURSE PRACTITIONER

## 2023-01-20 PROCEDURE — 63600175 PHARM REV CODE 636 W HCPCS: Performed by: EMERGENCY MEDICINE

## 2023-01-20 PROCEDURE — 84484 ASSAY OF TROPONIN QUANT: CPT | Performed by: NURSE PRACTITIONER

## 2023-01-20 PROCEDURE — 85025 COMPLETE CBC W/AUTO DIFF WBC: CPT | Performed by: NURSE PRACTITIONER

## 2023-01-20 PROCEDURE — 80053 COMPREHEN METABOLIC PANEL: CPT | Performed by: NURSE PRACTITIONER

## 2023-01-20 PROCEDURE — 25000003 PHARM REV CODE 250: Performed by: INTERNAL MEDICINE

## 2023-01-20 PROCEDURE — 96374 THER/PROPH/DIAG INJ IV PUSH: CPT

## 2023-01-20 PROCEDURE — 99285 EMERGENCY DEPT VISIT HI MDM: CPT | Mod: 25

## 2023-01-20 RX ORDER — POLYETHYLENE GLYCOL 3350 17 G/17G
17 POWDER, FOR SOLUTION ORAL DAILY
Status: DISCONTINUED | OUTPATIENT
Start: 2023-01-21 | End: 2023-01-21 | Stop reason: HOSPADM

## 2023-01-20 RX ORDER — TALC
6 POWDER (GRAM) TOPICAL NIGHTLY PRN
Status: DISCONTINUED | OUTPATIENT
Start: 2023-01-20 | End: 2023-01-21 | Stop reason: HOSPADM

## 2023-01-20 RX ORDER — ENOXAPARIN SODIUM 100 MG/ML
1 INJECTION SUBCUTANEOUS EVERY 12 HOURS
Status: DISCONTINUED | OUTPATIENT
Start: 2023-01-20 | End: 2023-01-21

## 2023-01-20 RX ORDER — HYDROCODONE BITARTRATE AND ACETAMINOPHEN 5; 325 MG/1; MG/1
1 TABLET ORAL EVERY 4 HOURS PRN
Status: DISCONTINUED | OUTPATIENT
Start: 2023-01-20 | End: 2023-01-21 | Stop reason: HOSPADM

## 2023-01-20 RX ORDER — ACETAMINOPHEN 500 MG
1000 TABLET ORAL EVERY 6 HOURS PRN
Status: DISCONTINUED | OUTPATIENT
Start: 2023-01-20 | End: 2023-01-21 | Stop reason: HOSPADM

## 2023-01-20 RX ORDER — FAMOTIDINE 20 MG/1
20 TABLET, FILM COATED ORAL DAILY
Status: DISCONTINUED | OUTPATIENT
Start: 2023-01-21 | End: 2023-01-21 | Stop reason: HOSPADM

## 2023-01-20 RX ORDER — DOXYCYCLINE 100 MG/1
100 CAPSULE ORAL EVERY 12 HOURS
Qty: 14 CAPSULE | Refills: 0 | OUTPATIENT
Start: 2023-01-20 | End: 2023-01-21

## 2023-01-20 RX ORDER — LISINOPRIL 10 MG/1
10 TABLET ORAL DAILY
Status: DISCONTINUED | OUTPATIENT
Start: 2023-01-21 | End: 2023-01-21 | Stop reason: HOSPADM

## 2023-01-20 RX ORDER — SODIUM CHLORIDE 0.9 % (FLUSH) 0.9 %
10 SYRINGE (ML) INJECTION
Status: DISCONTINUED | OUTPATIENT
Start: 2023-01-20 | End: 2023-01-21 | Stop reason: HOSPADM

## 2023-01-20 RX ORDER — ACETAMINOPHEN 325 MG/1
650 TABLET ORAL EVERY 8 HOURS PRN
Status: DISCONTINUED | OUTPATIENT
Start: 2023-01-20 | End: 2023-01-21 | Stop reason: HOSPADM

## 2023-01-20 RX ORDER — ONDANSETRON 2 MG/ML
4 INJECTION INTRAMUSCULAR; INTRAVENOUS EVERY 8 HOURS PRN
Status: DISCONTINUED | OUTPATIENT
Start: 2023-01-20 | End: 2023-01-21 | Stop reason: HOSPADM

## 2023-01-20 RX ADMIN — HYDROCODONE BITARTRATE AND ACETAMINOPHEN 1 TABLET: 5; 325 TABLET ORAL at 05:01

## 2023-01-20 RX ADMIN — ACETAMINOPHEN 1000 MG: 500 TABLET, FILM COATED ORAL at 09:01

## 2023-01-20 RX ADMIN — ONDANSETRON 4 MG: 2 INJECTION INTRAMUSCULAR; INTRAVENOUS at 08:01

## 2023-01-20 RX ADMIN — ENOXAPARIN SODIUM 80 MG: 40 INJECTION SUBCUTANEOUS at 04:01

## 2023-01-20 NOTE — Clinical Note
Diagnosis: SOB (shortness of breath) [079440]   Future Attending Provider: DANNI THOMAS [[id number]]   Admitting Provider:: DANNI THOMAS [[id number]]

## 2023-01-20 NOTE — ED PROVIDER NOTES
Encounter Date: 1/20/2023    SCRIBE #1 NOTE: I, Doretha Callie, am scribing for, and in the presence of,  Stephany Vanegas MD. I have scribed the following portions of the note - Other sections scribed: HPI,ROS,PE.     History     Chief Complaint   Patient presents with    Shortness of Breath     C/o SOB and back pain that started 4 days ago. Dr. Manrique ordered a d-dimer and CT scan which showed PE therefore sent to ED for evaluation. Denies chest pain.      77-year-old female with past medical history of HTN presenting to ED for further evaluation by Dr. Manrique, pt had d-dimer and CT scan which showed PE. Pt c/o SOB and back pain, states she had similar symptoms approximately a year ago and was found to have a nodule on the left lung. She notes SOB worsens with deep inspiration and minimal exertion. Pt denies CP and leg swelling. Per records, she went to urgent care on 1/13 and was treated for a UTI and completed abx yesterday. Notes she had appendectomy on 12/10/22.      PCP:  Melanie Manrique MD     The history is provided by the patient. No  was used.   Shortness of Breath  This is a new problem. The problem occurs intermittently.The current episode started more than 2 days ago. The problem has not changed since onset.Pertinent negatives include no fever, no headaches, no sore throat, no ear pain, no cough, no wheezing, no chest pain, no vomiting, no abdominal pain and no rash.   Review of patient's allergies indicates:   Allergen Reactions    Hydrochlorothiazide Rash    Ketoconazole Rash     Past Medical History:   Diagnosis Date    Hypertension      Past Surgical History:   Procedure Laterality Date    APPENDECTOMY  12/10/2022    BREAST LUMPECTOMY      KNEE ARTHROSCOPY      LIPOMA RESECTION       Family History   Problem Relation Age of Onset    Liver cancer Mother     Heart disease Father     Hypertension Sister      Social History     Tobacco Use    Smoking status: Never    Smokeless tobacco:  Never   Substance Use Topics    Alcohol use: Not Currently    Drug use: Never     Review of Systems   Constitutional:  Negative for chills, diaphoresis and fever.   HENT:  Negative for congestion, ear pain, sinus pain and sore throat.    Eyes:  Negative for pain, discharge and visual disturbance.   Respiratory:  Positive for shortness of breath. Negative for cough, wheezing and stridor.    Cardiovascular:  Negative for chest pain and palpitations.   Gastrointestinal:  Negative for abdominal pain, constipation, diarrhea, nausea, rectal pain and vomiting.   Genitourinary:  Negative for dysuria and hematuria.   Musculoskeletal:  Positive for back pain. Negative for myalgias.   Skin:  Negative for rash.   Neurological:  Negative for dizziness, syncope, numbness and headaches.   Hematological: Negative.    Psychiatric/Behavioral: Negative.     All other systems reviewed and are negative.    Physical Exam     Initial Vitals [01/20/23 1506]   BP Pulse Resp Temp SpO2   (!) 161/83 (!) 116 (!) 22 97.3 °F (36.3 °C) 96 %      MAP       --         Physical Exam    Nursing note and vitals reviewed.  Constitutional: She appears well-developed and well-nourished. She is not diaphoretic. No distress.   HENT:   Head: Normocephalic and atraumatic.   Nose: Nose normal.   Mouth/Throat: Oropharynx is clear and moist.   Eyes: Conjunctivae and EOM are normal. Pupils are equal, round, and reactive to light.   Neck: Trachea normal. Neck supple.   Normal range of motion.  Cardiovascular:  Regular rhythm, normal heart sounds and intact distal pulses.           No murmur heard.  Slightly tachycardic    Pulmonary/Chest: Breath sounds normal. No respiratory distress. She has no wheezes. She has no rhonchi. She has no rales. She exhibits no tenderness.   Abdominal: Abdomen is soft. Bowel sounds are normal. She exhibits no distension and no mass. There is no abdominal tenderness. There is no rebound and no guarding.   Musculoskeletal:          General: No tenderness or edema. Normal range of motion.      Cervical back: Normal range of motion and neck supple.      Lumbar back: Normal. Normal range of motion.     Neurological: She is alert and oriented to person, place, and time. She has normal strength. No cranial nerve deficit or sensory deficit.   Skin: Skin is warm and dry. Capillary refill takes less than 2 seconds. No abscess noted. No erythema. No pallor.   Psychiatric: She has a normal mood and affect. Her behavior is normal. Judgment and thought content normal.       ED Course   Critical Care    Date/Time: 1/20/2023 5:07 PM  Performed by: Stephany Vanegas MD  Authorized by: Stephany Vanegas MD   Direct patient critical care time: 15 minutes  Ordering / reviewing critical care time: 5 minutes  Documentation critical care time: 7 minutes  Consulting other physicians critical care time: 5 minutes  Total critical care time (exclusive of procedural time) : 32 minutes  Critical care was necessary to treat or prevent imminent or life-threatening deterioration of the following conditions: pulmonary embolism.  Critical care was time spent personally by me on the following activities: development of treatment plan with patient or surrogate, discussions with consultants, evaluation of patient's response to treatment, examination of patient, obtaining history from patient or surrogate, ordering and performing treatments and interventions, ordering and review of laboratory studies, pulse oximetry, re-evaluation of patient's condition and review of old charts.      Labs Reviewed   COMPREHENSIVE METABOLIC PANEL - Abnormal; Notable for the following components:       Result Value    Sodium Level 130 (*)     Carbon Dioxide 17 (*)     Glucose Level 124 (*)     Creatinine 1.14 (*)     Albumin Level 3.2 (*)     Globulin 3.9 (*)     Albumin/Globulin Ratio 0.8 (*)     All other components within normal limits   PROTIME-INR - Abnormal; Notable for the following  components:    PT 15.1 (*)     All other components within normal limits   CBC WITH DIFFERENTIAL - Abnormal; Notable for the following components:    RBC 3.99 (*)     MCV 96.5 (*)     MPV 11.6 (*)     All other components within normal limits   B-TYPE NATRIURETIC PEPTIDE - Normal   TROPONIN I - Normal   CBC W/ AUTO DIFFERENTIAL    Narrative:     The following orders were created for panel order CBC Auto Differential.  Procedure                               Abnormality         Status                     ---------                               -----------         ------                     CBC with Differential[[id number]]        Abnormal            Final result                 Please view results for these tests on the individual orders.          Imaging Results    None        Medical Decision Making  Problems Addressed:  Benign essential HTN: chronic illness or injury  Pleuritic chest pain: acute illness or injury that poses a threat to life or bodily functions  Pulmonary embolism: acute illness or injury that poses a threat to life or bodily functions  Pulmonary embolism and infarction: acute illness or injury that poses a threat to life or bodily functions  SOB (shortness of breath): acute illness or injury that poses a threat to life or bodily functions    Amount and/or Complexity of Data Reviewed  External Data Reviewed: labs, radiology and notes.  Labs: ordered. Decision-making details documented in ED Course.  ECG/medicine tests: ordered and independent interpretation performed.    Risk  Prescription drug management.  Drug therapy requiring intensive monitoring for toxicity.  Decision regarding hospitalization.        Medications   enoxaparin injection 80 mg (80 mg Subcutaneous Given 1/20/23 7379)   sodium chloride 0.9% flush 10 mL (has no administration in time range)   melatonin tablet 6 mg (has no administration in time range)   acetaminophen tablet 650 mg (has no administration in time range)    HYDROcodone-acetaminophen 5-325 mg per tablet 1 tablet (has no administration in time range)   polyethylene glycol packet 17 g (has no administration in time range)   famotidine tablet 20 mg (has no administration in time range)   ondansetron injection 4 mg (has no administration in time range)   lisinopriL tablet 10 mg (has no administration in time range)     Medical Decision Making:   History:   Old Medical Records: I decided to obtain old medical records.  Old Records Summarized: records from clinic visits.       <> Summary of Records: Shortness of breath and pleuritic back pain  Initial Assessment:   Outpatient ct with PE  Differential Diagnosis:   PE, error, nstemi, anemia, heart failure, heart strain, musculoskeletal pain  Independently Interpreted Test(s):   I have ordered and independently interpreted EKG Reading(s) - see prior notes  Clinical Tests:   Lab Tests: Ordered and Reviewed  The following lab test(s) were unremarkable: CBC, CMP, Troponin and BNP  Radiological Study: Reviewed  Medical Tests: Ordered and Reviewed  ED Management:  Ekg with sinus tachycardia and tachynea on exam. Reviewed imaging report and discussed with DR. Harper on call for Dr. Manrique who agrees with lovenox, admit on tele, echo. Lower extremity us would not add much therefore can skip, likely to start NOAC in am   Cbc, cmp, trop and bnp ordered and reviewed, benign. Had ua yesterday without any hematuria therefore recent uti with hematuria has resolved. No hemodynamic instability, is symptomatic therefore obs on tele. Lovenox 1 mg/kg administered. Potentially precipitated by recent surgery although this is far out for this to occur  Other:   I have discussed this case with another health care provider.        Scribe Attestation:   Scribe #1: I performed the above scribed service and the documentation accurately describes the services I performed. I attest to the accuracy of the note.  Comments: Attending:   Physician Attestation  Statement for Scribe #1: IStephany MD, personally performed the services described in this documentation. All medical record entries made by the scribe were at my direction and in my presence.  I have reviewed the chart and agree that the record reflects my personal performance and is accurate and complete.        Attending Attestation:           Physician Attestation for Scribe:  Physician Attestation Statement for Scribe #1: IStephany MD, reviewed documentation, as scribed by Doretha Ledesma in my presence, and it is both accurate and complete.           ED Course as of 01/20/23 1712 Fri Jan 20, 2023   1556 Discussed with Dr. Harper, on call for Dr. Manrique, obs, lovenox, echo [BS]   1557 Ekg performed at 1502 rate 108 sinus tachycardia with nonspecific st abnormality [BS]      ED Course User Index  [BS] Stephany Vanegas MD                 Clinical Impression:   Final diagnoses:  [R06.02] SOB (shortness of breath)  [I26.99] Pulmonary embolism  [I10] Benign essential HTN (Primary)  [R07.81] Pleuritic chest pain  [I26.99] Pulmonary embolism and infarction        ED Disposition Condition    Observation Stable                Stephany Vanegas MD  01/20/23 1711       Stephany Vanegas MD  01/20/23 1712

## 2023-01-20 NOTE — FIRST PROVIDER EVALUATION
Medical screening examination initiated.  I have conducted a focused provider triage encounter, findings are as follows:    Brief history of present illness:  Patient states that her PCP sent her to the ED due to her outpatient CT scan of her chest showing a PE.     There were no vitals filed for this visit.    Pertinent physical exam:  Awake, alert, ambulatory      Brief workup plan:  labs    Preliminary workup initiated; this workup will be continued and followed by the physician or advanced practice provider that is assigned to the patient when roomed.

## 2023-01-20 NOTE — TELEPHONE ENCOUNTER
I spoke to patient.  Will send a rx for doxycyline for suspected pneumonia.  I told her to go to ER if sx worsen over the weekend.  Otherwise I want her to call me Monday to report how she is feeling.  Will get CT to further evaluate.

## 2023-01-21 VITALS
SYSTOLIC BLOOD PRESSURE: 130 MMHG | BODY MASS INDEX: 31.28 KG/M2 | HEART RATE: 97 BPM | DIASTOLIC BLOOD PRESSURE: 62 MMHG | WEIGHT: 170 LBS | RESPIRATION RATE: 25 BRPM | HEIGHT: 62 IN | OXYGEN SATURATION: 95 % | TEMPERATURE: 97 F

## 2023-01-21 LAB
AV INDEX (PROSTH): 0.77
AV MEAN GRADIENT: 4 MMHG
AV PEAK GRADIENT: 7 MMHG
AV VALVE AREA: 2.42 CM2
AV VELOCITY RATIO: 0.85
BSA FOR ECHO PROCEDURE: 1.84 M2
CV ECHO LV RWT: 0.61 CM
DOP CALC AO PEAK VEL: 1.31 M/S
DOP CALC AO VTI: 22.7 CM
DOP CALC LVOT AREA: 3.1 CM2
DOP CALC LVOT DIAMETER: 2 CM
DOP CALC LVOT PEAK VEL: 1.12 M/S
DOP CALC LVOT STROKE VOLUME: 54.95 CM3
DOP CALC MV VTI: 22.3 CM
DOP CALCLVOT PEAK VEL VTI: 17.5 CM
E WAVE DECELERATION TIME: 172 MSEC
E/A RATIO: 0.83
E/E' RATIO: 12 M/S
ECHO LV POSTERIOR WALL: 1.12 CM (ref 0.6–1.1)
EJECTION FRACTION: 60 %
FRACTIONAL SHORTENING: 37 % (ref 28–44)
INTERVENTRICULAR SEPTUM: 1.21 CM (ref 0.6–1.1)
LEFT ATRIUM SIZE: 2.1 CM
LEFT INTERNAL DIMENSION IN SYSTOLE: 2.29 CM (ref 2.1–4)
LEFT VENTRICLE DIASTOLIC VOLUME INDEX: 31.63 ML/M2
LEFT VENTRICLE DIASTOLIC VOLUME: 56.3 ML
LEFT VENTRICLE MASS INDEX: 78 G/M2
LEFT VENTRICLE SYSTOLIC VOLUME INDEX: 10.1 ML/M2
LEFT VENTRICLE SYSTOLIC VOLUME: 17.9 ML
LEFT VENTRICULAR INTERNAL DIMENSION IN DIASTOLE: 3.65 CM (ref 3.5–6)
LEFT VENTRICULAR MASS: 138.06 G
LV LATERAL E/E' RATIO: 10.29 M/S
LV SEPTAL E/E' RATIO: 14.4 M/S
LVOT MG: 2 MMHG
LVOT MV: 0.71 CM/S
MV MEAN GRADIENT: 1 MMHG
MV PEAK A VEL: 0.87 M/S
MV PEAK E VEL: 0.72 M/S
MV PEAK GRADIENT: 4 MMHG
MV STENOSIS PRESSURE HALF TIME: 45 MS
MV VALVE AREA BY CONTINUITY EQUATION: 2.46 CM2
MV VALVE AREA P 1/2 METHOD: 4.89 CM2
PISA TR MAX VEL: 2.79 M/S
PV PEAK VELOCITY: 1.03 CM/S
RA PRESSURE: 3 MMHG
TDI LATERAL: 0.07 M/S
TDI SEPTAL: 0.05 M/S
TDI: 0.06 M/S
TR MAX PG: 31 MMHG
TRICUSPID ANNULAR PLANE SYSTOLIC EXCURSION: 1.47 CM
TV REST PULMONARY ARTERY PRESSURE: 34 MMHG

## 2023-01-21 PROCEDURE — 25000003 PHARM REV CODE 250: Performed by: INTERNAL MEDICINE

## 2023-01-21 PROCEDURE — 99223 PR INITIAL HOSPITAL CARE,LEVL III: ICD-10-PCS | Mod: ,,, | Performed by: INTERNAL MEDICINE

## 2023-01-21 PROCEDURE — G0378 HOSPITAL OBSERVATION PER HR: HCPCS

## 2023-01-21 PROCEDURE — 99223 1ST HOSP IP/OBS HIGH 75: CPT | Mod: ,,, | Performed by: INTERNAL MEDICINE

## 2023-01-21 PROCEDURE — 25000003 PHARM REV CODE 250: Performed by: EMERGENCY MEDICINE

## 2023-01-21 RX ORDER — ACETAMINOPHEN 500 MG
1000 TABLET ORAL EVERY 6 HOURS PRN
Qty: 30 TABLET | Refills: 0
Start: 2023-01-21 | End: 2023-05-22

## 2023-01-21 RX ADMIN — ACETAMINOPHEN 1000 MG: 500 TABLET, FILM COATED ORAL at 05:01

## 2023-01-21 RX ADMIN — APIXABAN 5 MG: 5 TABLET, FILM COATED ORAL at 08:01

## 2023-01-21 RX ADMIN — LISINOPRIL 10 MG: 10 TABLET ORAL at 09:01

## 2023-01-21 NOTE — ASSESSMENT & PLAN NOTE
- Provoked by recent surgery one month ago  - Started on Lovenox yesterday  - VS are stable, no shortness of breath today.  - Start Eliquis today and plan for discharge home later today

## 2023-01-21 NOTE — SUBJECTIVE & OBJECTIVE
Past Medical History:   Diagnosis Date    Hypertension        Past Surgical History:   Procedure Laterality Date    APPENDECTOMY  12/10/2022    BREAST LUMPECTOMY      KNEE ARTHROSCOPY      LIPOMA RESECTION         Review of patient's allergies indicates:   Allergen Reactions    Hydrochlorothiazide Rash    Ketoconazole Rash       Current Facility-Administered Medications on File Prior to Encounter   Medication    [COMPLETED] iodixanoL (VISIPAQUE 320) injection 80 mL     Current Outpatient Medications on File Prior to Encounter   Medication Sig    doxycycline (VIBRAMYCIN) 100 MG Cap Take 1 capsule (100 mg total) by mouth every 12 (twelve) hours. for 7 days    lisinopriL 10 MG tablet TAKE 1 TABLET BY MOUTH EVERY DAY    STELARA 45 mg/0.5 mL Syrg syringe every 3 (three) months.     Family History       Problem Relation (Age of Onset)    Heart disease Father    Hypertension Sister    Liver cancer Mother          Tobacco Use    Smoking status: Never    Smokeless tobacco: Never   Substance and Sexual Activity    Alcohol use: Not Currently    Drug use: Never    Sexual activity: Not Currently     Review of Systems   Constitutional:  Negative for chills.   HENT:  Negative for nosebleeds.    Eyes:  Negative for redness.   Respiratory:  Negative for shortness of breath.    Cardiovascular:  Negative for chest pain.   Gastrointestinal:  Negative for abdominal pain.   Genitourinary:  Negative for dysuria.   Musculoskeletal:  Negative for back pain.   Neurological:  Negative for headaches.   Psychiatric/Behavioral:  Negative for behavioral problems.    Objective:     Vital Signs (Most Recent):  Temp: 97.3 °F (36.3 °C) (01/20/23 1506)  Pulse: 78 (01/21/23 0510)  Resp: 20 (01/21/23 0510)  BP: 118/71 (01/21/23 0510)  SpO2: 95 % (01/21/23 0510) Vital Signs (24h Range):  Temp:  [97.3 °F (36.3 °C)] 97.3 °F (36.3 °C)  Pulse:  [] 78  Resp:  [13-22] 20  SpO2:  [95 %-97 %] 95 %  BP: (117-161)/(64-83) 118/71     Weight: 77.1 kg (170  lb)  Body mass index is 31.09 kg/m².    Physical Exam  Vitals reviewed.   Eyes:      Extraocular Movements: Extraocular movements intact.      Pupils: Pupils are equal, round, and reactive to light.   Cardiovascular:      Rate and Rhythm: Normal rate and regular rhythm.   Pulmonary:      Effort: Pulmonary effort is normal.      Breath sounds: Normal breath sounds.   Abdominal:      General: Bowel sounds are normal.      Palpations: Abdomen is soft.   Neurological:      General: No focal deficit present.      Mental Status: She is alert.   Psychiatric:         Mood and Affect: Mood normal.         CRANIAL NERVES     CN III, IV, VI   Pupils are equal, round, and reactive to light.     Significant Labs: All pertinent labs within the past 24 hours have been reviewed.    Significant Imaging: I have reviewed all pertinent imaging results/findings within the past 24 hours.

## 2023-01-21 NOTE — HPI
77-year-old female with past medical history of HTN presented to ED for further evaluation after outpatient CT scan 1/20 which showed PE. Pt c/o SOB and back pain, states she had similar symptoms approximately a year ago and was found to have a nodule on the left lung. She notes SOB worsens with deep inspiration and minimal exertion. Pt denies leg swelling. Per records, she went to urgent care on 1/13 and was treated for a UTI and completed abx yesterday. She had appendectomy about one month ago and was immobile for about a week. She was started on Lovenox yesterday.

## 2023-01-21 NOTE — H&P
[facility]  Emergency [facility]  [facility]  History & Physical    Patient Name: Neeru Watson  MRN: [id number]  Patient Class: OP- Observation  Admission Date: 1/20/2023  Attending Physician: Melanie Manrique MD   Primary Care Provider: Melanie Manrique MD         Patient information was obtained from patient and ER records.     Subjective:     Principal Problem:Acute pulmonary embolism    Chief Complaint:   Chief Complaint   Patient presents with    Shortness of Breath     C/o SOB and back pain that started 4 days ago. Dr. Manrique ordered a d-dimer and CT scan which showed PE therefore sent to ED for evaluation. Denies chest pain.         HPI: 77-year-old female with past medical history of HTN presented to ED for further evaluation after outpatient CT scan 1/20 which showed PE. Pt c/o SOB and back pain, states she had similar symptoms approximately a year ago and was found to have a nodule on the left lung. She notes SOB worsens with deep inspiration and minimal exertion. Pt denies leg swelling. Per records, she went to urgent care on 1/13 and was treated for a UTI and completed abx yesterday. She had appendectomy about one month ago and was immobile for about a week. She was started on Lovenox yesterday.       Past Medical History:   Diagnosis Date    Hypertension        Past Surgical History:   Procedure Laterality Date    APPENDECTOMY  12/10/2022    BREAST LUMPECTOMY      KNEE ARTHROSCOPY      LIPOMA RESECTION         Review of patient's allergies indicates:   Allergen Reactions    Hydrochlorothiazide Rash    Ketoconazole Rash       Current Facility-Administered Medications on File Prior to Encounter   Medication    [COMPLETED] iodixanoL (VISIPAQUE 320) injection 80 mL     Current Outpatient Medications on File Prior to Encounter   Medication Sig    doxycycline (VIBRAMYCIN) 100 MG Cap Take 1 capsule (100 mg total) by mouth every 12 (twelve) hours. for 7 days    lisinopriL 10 MG tablet TAKE  1 TABLET BY MOUTH EVERY DAY    STELARA 45 mg/0.5 mL Syrg syringe every 3 (three) months.     Family History       Problem Relation (Age of Onset)    Heart disease Father    Hypertension Sister    Liver cancer Mother          Tobacco Use    Smoking status: Never    Smokeless tobacco: Never   Substance and Sexual Activity    Alcohol use: Not Currently    Drug use: Never    Sexual activity: Not Currently     Review of Systems   Constitutional:  Negative for chills.   HENT:  Negative for nosebleeds.    Eyes:  Negative for redness.   Respiratory:  Negative for shortness of breath.    Cardiovascular:  Negative for chest pain.   Gastrointestinal:  Negative for abdominal pain.   Genitourinary:  Negative for dysuria.   Musculoskeletal:  Negative for back pain.   Neurological:  Negative for headaches.   Psychiatric/Behavioral:  Negative for behavioral problems.    Objective:     Vital Signs (Most Recent):  Temp: 97.3 °F (36.3 °C) (01/20/23 1506)  Pulse: 78 (01/21/23 0510)  Resp: 20 (01/21/23 0510)  BP: 118/71 (01/21/23 0510)  SpO2: 95 % (01/21/23 0510) Vital Signs (24h Range):  Temp:  [97.3 °F (36.3 °C)] 97.3 °F (36.3 °C)  Pulse:  [] 78  Resp:  [13-22] 20  SpO2:  [95 %-97 %] 95 %  BP: (117-161)/(64-83) 118/71     Weight: 77.1 kg (170 lb)  Body mass index is 31.09 kg/m².    Physical Exam  Vitals reviewed.   Eyes:      Extraocular Movements: Extraocular movements intact.      Pupils: Pupils are equal, round, and reactive to light.   Cardiovascular:      Rate and Rhythm: Normal rate and regular rhythm.   Pulmonary:      Effort: Pulmonary effort is normal.      Breath sounds: Normal breath sounds.   Abdominal:      General: Bowel sounds are normal.      Palpations: Abdomen is soft.   Neurological:      General: No focal deficit present.      Mental Status: She is alert.   Psychiatric:         Mood and Affect: Mood normal.         CRANIAL NERVES     CN III, IV, VI   Pupils are equal, round, and reactive to light.      Significant Labs: All pertinent labs within the past 24 hours have been reviewed.    Significant Imaging: I have reviewed all pertinent imaging results/findings within the past 24 hours.    Assessment/Plan:     * Acute pulmonary embolism  - Provoked by recent surgery one month ago  - Started on Lovenox yesterday  - VS are stable, no shortness of breath today.  - Start Eliquis today and plan for discharge home later today    Hypertension  - Resumed home dose of lisinopril    Hyponatremia  - She is drinking too much water at home  - Discussed cutting back    VTE Risk Mitigation (From admission, onward)         Ordered     apixaban tablet 5 mg  2 times daily         01/21/23 0821                   Tino Harper II, MD  Department of Hospital Medicine   [facility] - Emergency Dept

## 2023-01-23 NOTE — DISCHARGE SUMMARY
[facility] - Emergency Dept  Hospital Medicine  Discharge Summary      Patient Name: Neeru Watson  MRN: [id number]  TAMIKA: [id number]  Patient Class: OP- Observation  Admission Date: 1/20/2023  Hospital Length of Stay: 0 days  Discharge Date and Time: 1/21/2023  9:48 AM  Attending Physician: No att. providers found   Discharging Provider: Tino Harper II, MD  Primary Care Provider: Melanie Manrique MD    Primary Care Team: Networked reference to record PCT     HPI:   77-year-old female with past medical history of HTN presented to ED for further evaluation after outpatient CT scan 1/20 which showed PE. Pt c/o SOB and back pain, states she had similar symptoms approximately a year ago and was found to have a nodule on the left lung. She notes SOB worsens with deep inspiration and minimal exertion. Pt denies leg swelling. Per records, she went to urgent care on 1/13 and was treated for a UTI and completed abx yesterday. She had appendectomy about one month ago and was immobile for about a week. She was started on Lovenox yesterday.       * No surgery found *      Hospital Course:   * Acute pulmonary embolism  - Provoked by recent surgery one month ago  - Started on Lovenox yesterday  - VS are stable, no shortness of breath today.  - Start Eliquis today and plan for discharge home later today     Hypertension  - Resumed home dose of lisinopril     Hyponatremia  - She is drinking too much water at home  - Discussed cutting back       Goals of Care Treatment Preferences:  Code Status: Full Code      Consults:     No new Assessment & Plan notes have been filed under this hospital service since the last note was generated.  Service: Hospital Medicine    Final Active Diagnoses:    Diagnosis Date Noted POA    PRINCIPAL PROBLEM:  Acute pulmonary embolism [I26.99] 01/20/2023 Yes    Hypertension [I10] 05/20/2022 Yes    Hyponatremia [E87.1] 01/20/2023 Yes      Problems Resolved During this Admission:        Discharged Condition: stable    Disposition: Home or Self Care    Follow Up:   Follow-up Information     Melanie Manrique MD Follow up in 1 week(s).    Specialty: Internal Medicine  Contact information:  [address]  [facility] [address] [phone]                       Patient Instructions:   No discharge procedures on file.    Significant Diagnostic Studies: Labs: BMP: No results for input(s): GLU, NA, K, CL, CO2, BUN, CREATININE, CALCIUM, MG in the last 48 hours.    Pending Diagnostic Studies:     None         Medications:  Reconciled Home Medications:      Medication List      START taking these medications    acetaminophen 500 MG tablet  Commonly known as: TYLENOL  Take 2 tablets (1,000 mg total) by mouth every 6 (six) hours as needed for Pain.     apixaban 5 mg Tab  Commonly known as: ELIQUIS  Take 1 tablet (5 mg total) by mouth 2 (two) times daily.        CONTINUE taking these medications    lisinopriL 10 MG tablet  TAKE 1 TABLET BY MOUTH EVERY DAY     STELARA 45 mg/0.5 mL Syrg syringe  Generic drug: ustekinumab  every 3 (three) months.        STOP taking these medications    doxycycline 100 MG Cap  Commonly known as: VIBRAMYCIN            Indwelling Lines/Drains at time of discharge:   Lines/Drains/Airways     None                 Time spent on the discharge of patient: 45 minutes         Tino Harper II, MD  Department of Hospital Medicine  [facility] - Emergency Dept

## 2023-01-23 NOTE — HOSPITAL COURSE
* Acute pulmonary embolism  - Provoked by recent surgery one month ago  - Started on Lovenox yesterday  - VS are stable, no shortness of breath today.  - Start Eliquis today and plan for discharge home later today     Hypertension  - Resumed home dose of lisinopril     Hyponatremia  - She is drinking too much water at home  - Discussed cutting back

## 2023-01-24 ENCOUNTER — TELEPHONE (OUTPATIENT)
Dept: INTERNAL MEDICINE | Facility: CLINIC | Age: 78
End: 2023-01-24
Payer: MEDICARE

## 2023-01-24 NOTE — TELEPHONE ENCOUNTER
----- Message from Belle Salazar LPN sent at 1/24/2023  1:53 PM CST -----  Regarding: GODWIN Manrique 1/31/23 @0920  Are there any outstanding tasks in the chart? no    Is there any documentation of tasks? no    Has patient seen another physician, been to the ER, UCC, or admitted to hospital since last visit?    Has the patient done blood work or imaging since last visit?

## 2023-01-31 ENCOUNTER — OFFICE VISIT (OUTPATIENT)
Dept: INTERNAL MEDICINE | Facility: CLINIC | Age: 78
End: 2023-01-31
Payer: MEDICARE

## 2023-01-31 VITALS
RESPIRATION RATE: 18 BRPM | BODY MASS INDEX: 32.57 KG/M2 | OXYGEN SATURATION: 98 % | HEIGHT: 62 IN | SYSTOLIC BLOOD PRESSURE: 136 MMHG | DIASTOLIC BLOOD PRESSURE: 74 MMHG | WEIGHT: 177 LBS | HEART RATE: 85 BPM

## 2023-01-31 DIAGNOSIS — L40.0 PLAQUE PSORIASIS: ICD-10-CM

## 2023-01-31 DIAGNOSIS — E55.9 VITAMIN D DEFICIENCY: Primary | ICD-10-CM

## 2023-01-31 DIAGNOSIS — E78.5 HYPERLIPIDEMIA, UNSPECIFIED HYPERLIPIDEMIA TYPE: ICD-10-CM

## 2023-01-31 DIAGNOSIS — Z79.899 DRUG-INDUCED IMMUNODEFICIENCY: ICD-10-CM

## 2023-01-31 DIAGNOSIS — I26.99 ACUTE PULMONARY EMBOLISM WITHOUT ACUTE COR PULMONALE, UNSPECIFIED PULMONARY EMBOLISM TYPE: ICD-10-CM

## 2023-01-31 DIAGNOSIS — C50.912 MALIGNANT NEOPLASM OF LEFT FEMALE BREAST, UNSPECIFIED ESTROGEN RECEPTOR STATUS, UNSPECIFIED SITE OF BREAST: ICD-10-CM

## 2023-01-31 DIAGNOSIS — I10 PRIMARY HYPERTENSION: ICD-10-CM

## 2023-01-31 DIAGNOSIS — R73.09 ABNORMAL BLOOD SUGAR: ICD-10-CM

## 2023-01-31 DIAGNOSIS — D84.821 DRUG-INDUCED IMMUNODEFICIENCY: ICD-10-CM

## 2023-01-31 DIAGNOSIS — Z00.00 ENCOUNTER FOR MEDICARE ANNUAL WELLNESS EXAM: ICD-10-CM

## 2023-01-31 DIAGNOSIS — R07.81 PLEURITIC CHEST PAIN: ICD-10-CM

## 2023-01-31 PROBLEM — K35.30 ACUTE APPENDICITIS WITH LOCALIZED PERITONITIS, WITHOUT PERFORATION, ABSCESS, OR GANGRENE: Status: RESOLVED | Noted: 2022-12-10 | Resolved: 2023-01-31

## 2023-01-31 PROBLEM — Z85.3 HISTORY OF BREAST CANCER: Status: ACTIVE | Noted: 2022-05-20

## 2023-01-31 PROCEDURE — G0439 PR MEDICARE ANNUAL WELLNESS SUBSEQUENT VISIT: ICD-10-PCS | Mod: ,,, | Performed by: INTERNAL MEDICINE

## 2023-01-31 PROCEDURE — G0439 PPPS, SUBSEQ VISIT: HCPCS | Mod: ,,, | Performed by: INTERNAL MEDICINE

## 2023-01-31 NOTE — PROGRESS NOTES
Subjective:        Patient Care Team:  Melanie Manrique MD as PCP - General (Internal Medicine)  Melanie Manrique MD     Chief Complaint: Medicare AWV (Still having a little bit of pain when breathing deeply but it is better)      HPI:  She is here for a medicare wellness and f/u on the PE.  She is doing ok.  She still has some soreness in the right lung with deep breath.  She is trying to start walking again. She doesn't have any sob, occ she has issues trying to take a deep breath.  She feels generally weak, but there is no light headedness.    She was checking her BP at home , and it was 120s/60s.    She drinks a lot of water -- and she has now dropped off.  She was drinking 3-4 bottles of water daily.  Now she is drinkig one per day.  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 Pabon and was on injections for the osteoporosis in the past.  She thinks the last bone density was in 2012.     Colon Polyps 5/20/2022   Vitamin D Deficiency 5/20/2022   Acute Appendicitis With Localized Peritonitis, Without Perforation, Abscess, Or Gangrene (Resolved) 12/10/2022    Formatting of this note might be different from the original.  Added automatically from request for surgery [id number]    Last Assessment & Plan:   Formatting of this note might be different from the original.  1.5 weeks s/p lap appy    - pathology reviewed- c/w diagnosis  - RUE ultrasound ordered for arm swelling  - will call with results  - RTC prn          The patient's Health  Maintenance was reviewed and the following appears to be due:   Health Maintenance Due   Topic Date Due    COVID-19 Vaccine (1) Never done    Pneumococcal Vaccines (Age 65+) (1 - PCV) Never done    Shingles Vaccine (1 of 2) Never done    DEXA Scan  12/04/2018    Influenza Vaccine (1) Never done       Past Medical History:  Past Medical History:   Diagnosis Date    Acute appendicitis with localized peritonitis, without perforation, abscess, or gangrene 12/10/2022    Formatting of this note might be different from the original. Added automatically from request for surgery [id number]  Last Assessment & Plan:  Formatting of this note might be different from the original. 1.5 weeks s/p lap appy  - pathology reviewed- c/w diagnosis - RUE ultrasound ordered for arm swelling - will call with results - RTC prn    Hypertension      Past Surgical History:   Procedure Laterality Date    APPENDECTOMY  12/10/2022    BREAST LUMPECTOMY      KNEE ARTHROSCOPY      LIPOMA RESECTION       Review of patient's allergies indicates:   Allergen Reactions    Hydrochlorothiazide Rash    Ketoconazole Rash     Current Outpatient Medications on File Prior to Visit   Medication Sig Dispense Refill    acetaminophen (TYLENOL) 500 MG tablet Take 2 tablets (1,000 mg total) by mouth every 6 (six) hours as needed for Pain. 30 tablet 0    lisinopriL 10 MG tablet TAKE 1 TABLET BY MOUTH EVERY DAY 90 tablet 1    STELARA 45 mg/0.5 mL Syrg syringe every 3 (three) months.      [DISCONTINUED] apixaban (ELIQUIS) 5 mg Tab Take 1 tablet (5 mg total) by mouth 2 (two) times daily. 60 tablet 2     No current facility-administered medications on file prior to visit.     Social History     Socioeconomic History    Marital status:    Tobacco Use    Smoking status: Never    Smokeless tobacco: Never   Substance and Sexual Activity    Alcohol use: Not Currently    Drug use: Never    Sexual activity: Not Currently     Family History   Problem Relation Age of Onset     Liver cancer Mother     Heart disease Father     Hypertension Sister        Review of Systems    Patient Reported Health Risk Assessment  [age] 70-79  Are you male or female?: Female  During the past four weeks, how much have you been bothered by emotional problems such as feeling anxious, depressed, irritable, sad, or downhearted and blue?: Not at all  During the past five weeks, has your physical and/or emotional health limited your social activities with family, friends, neighbors, or groups?: Not at all  During the past four weeks, how much bodily pain have you generally had?: Mild pain  During the past four weeks, was someone available to help if you needed and wanted help?: Yes, as much as I wanted  During the past four weeks, what was the hardest physical activity you could do for at least two minutes?: Moderate  Can you get to places out of walking distance without help?  (For example, can you travel alone on buses or taxis, or drive your own car?): Yes  Can you go shopping for groceries or clothes without someone's help?: Yes  Can you prepare your own meals?: Yes  Can you do your own housework without help?: Yes  Because of any health problems, do you need the help of another person with your personal care needs such as eating, bathing, dressing, or getting around the house?: No  Can you handle your own money without help?: Yes  During the past four weeks, how would you rate your health in general?: Excellent  How have things been going for you during the past four weeks?: Very well  Are you having difficulties driving your car?: No  Do you always fasten your seat belt when you are in a car?: Yes, usually  How often in the past four weeks have you been bothered by falling or dizzy when standing up?: Never  How often in the past four weeks have you been bothered by sexual problems?: Never  How often in the past four weeks have you been bothered by trouble eating well?: Never  How often in the  "past four weeks have you been bothered by teeth or denture problems?: Never  How often in the past four weeks have you been bothered with problems using the telephone?: Never  How often in the past four weeks have you been bothered by tiredness or fatigue?: Never  Have you fallen two or more times in the past year?: No  Are you afraid of falling?: No  Are you a smoker?: No  During the past four weeks, how many drinks of wine, beer, or other alcoholic beverages did you have?: No alcohol at all  Do you exercise for about 20 minutes three or more days a week?: No, I usually do not exercise this much  Have you been given any information to help you with hazards in your house that might hurt you?: Yes  Have you been given any information to help you with keeping track of your medications?: Yes  How often do you have trouble taking medicines the way you've been told to take them?: I always take them as prescribed  How confident are you that you can control and manage most of your health problems?: Very confident  What is your race? (Check all that apply.):     Opioid Screening: Patient medication list reviewed, patient is not taking prescription opioids. Patient is not using additional opioids than prescribed. Patient is at low risk of substance abuse based on this opioid use history.     Objective:   /74 (BP Location: Right arm, Patient Position: Sitting, BP Method: Small (Manual))   Pulse 85   Resp 18   Ht 5' 2.01" (1.575 m)   Wt 80.3 kg (177 lb)   SpO2 98%   BMI 32.37 kg/m²     Physical Exam  Vitals reviewed.   Constitutional:       General: She is not in acute distress.     Appearance: Normal appearance. She is not ill-appearing or diaphoretic.   HENT:      Head: Normocephalic and atraumatic.   Cardiovascular:      Rate and Rhythm: Normal rate and regular rhythm.      Heart sounds: Normal heart sounds.   Pulmonary:      Effort: Pulmonary effort is normal.      Breath sounds: Rales (right base) " present.   Skin:     General: Skin is warm and dry.   Neurological:      General: No focal deficit present.      Mental Status: She is alert.   Psychiatric:         Mood and Affect: Mood normal.         Behavior: Behavior normal.         Thought Content: Thought content normal.         Judgment: Judgment normal.         No flowsheet data found.  Fall Risk Assessment - Outpatient 1/31/2023 1/19/2023 5/23/2022   Mobility Status Ambulatory Ambulatory Ambulatory   Number of falls 0 0 0   Identified as fall risk 0 0 0           Depression Screening  Over the past two weeks, has the patient felt down, depressed, or hopeless?: No  Over the past two weeks, has the patient felt little interest or pleasure in doing things?: No  Functional Ability/Safety Screening  Was the patient's timed Up & Go test unsteady or longer than 30 seconds?: No  Does the patient need help with phone, transportation, shopping, preparing meals, housework, laundry, meds, or managing money?: No  Does the patient's home have rugs in the hallway, lack grab bars in the bathroom, lack handrails on the stairs or have poor lighting?: No  Have you noticed any hearing difficulties?: No  Cognitive Function (Assessed through direct observation with due consideration of information obtained by way of patient reports and/or concerns raised by family, friends, caretakers, or others)    Does the patient repeat questions/statements in the same day?: No  Does the patient have trouble remembering the date, year, and time?: No  Does the patient have difficulty managing finances?: No  Does the patient have a decreased sense of direction?: No    Assessment and Plan:     Encounter for Medicare annual wellness exam  Health Maintenance Due   Topic Date Due    COVID-19 Vaccine (1) Never done    Pneumococcal Vaccines (Age 65+) (1 - PCV) Never done    Shingles Vaccine (1 of 2) Never done    DEXA Scan  12/04/2018    Influenza Vaccine (1) Never done     She declined BMD.  And  she elects not to do the vaccines above.    Plaque psoriasis  She is followed by Dr. Fine and is on Stelara.    Pleuritic chest pain  improving    Acute pulmonary embolism  Will continue the eliquis for 4 mos total given that it was a provoked PE.   Follow up in about 6 months (around 7/31/2023).    Medications Ordered This Encounter   Medications    apixaban (ELIQUIS) 5 mg Tab     Sig: Take 1 tablet (5 mg total) by mouth 2 (two) times daily.     Dispense:  180 tablet     Refill:  1     [unfilled]  Orders Placed This Encounter   Procedures    CBC Auto Differential     Standing Status:   Future     Standing Expiration Date:   3/31/2024    Comprehensive Metabolic Panel     Standing Status:   Future     Standing Expiration Date:   3/31/2024    Lipid Panel     Standing Status:   Future     Standing Expiration Date:   7/31/2024    Vitamin D     Standing Status:   Future     Standing Expiration Date:   3/31/2024    Hemoglobin A1C     Standing Status:   Future     Standing Expiration Date:   3/31/2024         Medicare Annual Wellness and Personalized Prevention Plan:   Fall Risk + Home Safety + Hearing Impairment + Depression Screen + Cognitive Impairment Screen + Health Risk Assessment all reviewed    Advance Care Planning   I attest to discussing Advance Care Planning with patient and/or family member.  Education was provided including the importance of the Health Care Power of , Advance Directives, and/or [place] documentation.  The patient expressed understanding to the importance of this information and discussion.       Follow up in about 6 months (around 7/31/2023). In addition to their scheduled follow up, the patient has also been instructed to follow up on as needed basis.

## 2023-01-31 NOTE — ASSESSMENT & PLAN NOTE
Health Maintenance Due   Topic Date Due    COVID-19 Vaccine (1) Never done    Pneumococcal Vaccines (Age 65+) (1 - PCV) Never done    Shingles Vaccine (1 of 2) Never done    DEXA Scan  12/04/2018    Influenza Vaccine (1) Never done     She declined BMD.

## 2023-01-31 NOTE — PATIENT INSTRUCTIONS
Dao Siddiqi,     If you are due for any health screening(s) below please notify me so we can arrange them to be ordered and scheduled to maintain your health. Most healthy patients complete it. Don't lose out on improving your health.     All of your core healthy metrics are met.

## 2023-03-16 ENCOUNTER — TELEPHONE (OUTPATIENT)
Dept: INTERNAL MEDICINE | Facility: CLINIC | Age: 78
End: 2023-03-16
Payer: MEDICARE

## 2023-03-16 NOTE — TELEPHONE ENCOUNTER
I spoke with the pt. She is checking to see if there is an interaction with eliquis and stelara. She is prescribed the stelara injection for a skin rash condition by Dr. Lal.

## 2023-03-21 ENCOUNTER — LAB REQUISITION (OUTPATIENT)
Dept: LAB | Facility: HOSPITAL | Age: 78
End: 2023-03-21
Payer: MEDICARE

## 2023-03-21 DIAGNOSIS — L73.8 OTHER SPECIFIED FOLLICULAR DISORDERS: ICD-10-CM

## 2023-03-21 PROCEDURE — 87070 CULTURE OTHR SPECIMN AEROBIC: CPT | Performed by: DERMATOLOGY

## 2023-03-24 LAB — BACTERIA SPEC CULT: NORMAL

## 2023-04-24 PROBLEM — I26.99 ACUTE PULMONARY EMBOLISM: Status: RESOLVED | Noted: 2023-01-20 | Resolved: 2023-04-24

## 2023-05-08 PROBLEM — Z00.00 ENCOUNTER FOR MEDICARE ANNUAL WELLNESS EXAM: Status: RESOLVED | Noted: 2023-01-31 | Resolved: 2023-05-08

## 2023-05-10 ENCOUNTER — TELEPHONE (OUTPATIENT)
Dept: INTERNAL MEDICINE | Facility: CLINIC | Age: 78
End: 2023-05-10
Payer: MEDICARE

## 2023-05-10 NOTE — TELEPHONE ENCOUNTER
I spoke with pt. I told her according to LOV note, it has that Dr. Manrique wanted her to be on eliquis for a total of 4 months. Pt verbalized understanding. She said she is still having some soreness and she would like to come in for appt with Dr. Manrique. Will add to schedule for 11:20a tomorrow.

## 2023-05-11 ENCOUNTER — HOSPITAL ENCOUNTER (OUTPATIENT)
Dept: RADIOLOGY | Facility: HOSPITAL | Age: 78
Discharge: HOME | End: 2023-05-11
Attending: INTERNAL MEDICINE
Payer: MEDICARE

## 2023-05-11 ENCOUNTER — OFFICE VISIT (OUTPATIENT)
Dept: INTERNAL MEDICINE | Facility: CLINIC | Age: 78
End: 2023-05-11
Payer: MEDICARE

## 2023-05-11 VITALS
HEIGHT: 62 IN | RESPIRATION RATE: 18 BRPM | SYSTOLIC BLOOD PRESSURE: 148 MMHG | DIASTOLIC BLOOD PRESSURE: 62 MMHG | OXYGEN SATURATION: 99 % | WEIGHT: 181 LBS | HEART RATE: 85 BPM | BODY MASS INDEX: 33.31 KG/M2

## 2023-05-11 DIAGNOSIS — R07.81 PLEURITIC CHEST PAIN: ICD-10-CM

## 2023-05-11 DIAGNOSIS — R07.81 PLEURITIC CHEST PAIN: Primary | ICD-10-CM

## 2023-05-11 PROCEDURE — 99213 PR OFFICE/OUTPT VISIT, EST, LEVL III, 20-29 MIN: ICD-10-PCS | Mod: ,,, | Performed by: INTERNAL MEDICINE

## 2023-05-11 PROCEDURE — 99213 OFFICE O/P EST LOW 20 MIN: CPT | Mod: ,,, | Performed by: INTERNAL MEDICINE

## 2023-05-11 PROCEDURE — 71046 X-RAY EXAM CHEST 2 VIEWS: CPT | Mod: TC

## 2023-05-11 NOTE — ASSESSMENT & PLAN NOTE
Will get cxr to further evaluate.  If unrevealing, consider chest CT to re-evaluate abnormalities seen on prior imaging.

## 2023-05-11 NOTE — PROGRESS NOTES
Subjective:      Chief Complaint: Follow-up (Still having some soreness from the PE on R side of ribs. Still on eliquis- she has a week left to finish)      HPI:She is here with a rt posterior thoracic pain similar to when she had the PE.  She doesn't really feel like there is pain.  When she takes a breath, she notices a heaviness and tightness.  Denies cough.  Denies sob, but she does feel  like it limits her activity.  No other sx. Denies fevers.    Problem Noted   Plaque Psoriasis 1/31/2023   Malignant Neoplasm of Left Female Breast, Unspecified Estrogen Receptor Status, Unspecified Site of Breast 1/31/2023   Drug-Induced Immunodeficiency 1/31/2023   Pleuritic Chest Pain 1/19/2023   Hypertension 5/20/2022   History of Breast Cancer 5/20/2022   Obesity 5/20/2022   Osteoporosis 5/20/2022    had bone density 12/2015 that showed osteoporosis but patient opted not to treathad seen Dr. Pabon and was on injections for the osteoporosis in the past.  She thinks the last bone density was in 2012.       Colon Polyps 5/20/2022   Vitamin D Deficiency 5/20/2022   Encounter for Medicare Annual Wellness Exam (Resolved) 1/31/2023   Acute Pulmonary Embolism (Resolved) 1/20/2023   Acute Appendicitis With Localized Peritonitis, Without Perforation, Abscess, Or Gangrene (Resolved) 12/10/2022    Formatting of this note might be different from the original.  Added automatically from request for surgery [id number]    Last Assessment & Plan:   Formatting of this note might be different from the original.  1.5 weeks s/p lap appy    - pathology reviewed- c/w diagnosis  - RUE ultrasound ordered for arm swelling  - will call with results  - RTC prn            The patient's Health Maintenance was reviewed and the following appears to be due:   Health Maintenance Due   Topic Date Due    COVID-19 Vaccine (1) Never done    Pneumococcal Vaccines (Age 65+) (1 - PCV) Never done    Shingles Vaccine (1 of 2) Never done    DEXA Scan  12/04/2018     "TETANUS VACCINE  04/08/2023       Past Medical History:  Past Medical History:   Diagnosis Date    Acute appendicitis with localized peritonitis, without perforation, abscess, or gangrene 12/10/2022    Formatting of this note might be different from the original. Added automatically from request for surgery [id number]  Last Assessment & Plan:  Formatting of this note might be different from the original. 1.5 weeks s/p lap appy  - pathology reviewed- c/w diagnosis - RUE ultrasound ordered for arm swelling - will call with results - RTC prn    Hypertension      Review of patient's allergies indicates:   Allergen Reactions    Hydrochlorothiazide Rash    Ketoconazole Rash     Current Outpatient Medications on File Prior to Visit   Medication Sig Dispense Refill    acetaminophen (TYLENOL) 500 MG tablet Take 2 tablets (1,000 mg total) by mouth every 6 (six) hours as needed for Pain. 30 tablet 0    apixaban (ELIQUIS) 5 mg Tab Take 1 tablet (5 mg total) by mouth 2 (two) times daily. 180 tablet 1    lisinopriL 10 MG tablet TAKE 1 TABLET BY MOUTH EVERY DAY 90 tablet 3    STELARA 45 mg/0.5 mL Syrg syringe every 3 (three) months.       No current facility-administered medications on file prior to visit.       Review of Systems    Objective:   BP (!) 148/62 (BP Location: Right arm, Patient Position: Sitting, BP Method: Small (Manual))   Pulse 85   Resp 18   Ht 5' 2.01" (1.575 m)   Wt 82.1 kg (181 lb)   SpO2 99%   BMI 33.10 kg/m²     Physical Exam  Vitals reviewed.   Constitutional:       General: She is not in acute distress.     Appearance: Normal appearance. She is not ill-appearing or diaphoretic.   HENT:      Head: Normocephalic and atraumatic.   Cardiovascular:      Rate and Rhythm: Normal rate and regular rhythm.   Pulmonary:      Effort: Pulmonary effort is normal.      Breath sounds: Rales (rt base) present.   Skin:     General: Skin is warm and dry.   Neurological:      General: No focal deficit present.      Mental " Status: She is alert.   Psychiatric:         Mood and Affect: Mood normal.         Behavior: Behavior normal.         Thought Content: Thought content normal.         Judgment: Judgment normal.     Assessment and Plan:     Pleuritic chest pain  Will get cxr to further evaluate.  If unrevealing, consider chest CT to re-evaluate abnormalities seen on prior imaging.        No follow-ups on file.       [unfilled]  Orders Placed This Encounter   Procedures    X-Ray Chest PA And Lateral     Standing Status:   Future     Standing Expiration Date:   5/11/2024     Order Specific Question:   May the Radiologist modify the order per protocol to meet the clinical needs of the patient?     Answer:   Yes     Order Specific Question:   Release to patient     Answer:   Immediate

## 2023-05-15 DIAGNOSIS — R07.9 CHEST PAIN, UNSPECIFIED TYPE: Primary | ICD-10-CM

## 2023-05-18 DIAGNOSIS — R07.81 PLEURITIC CHEST PAIN: Primary | ICD-10-CM

## 2023-05-21 ENCOUNTER — HOSPITAL ENCOUNTER (INPATIENT)
Facility: HOSPITAL | Age: 78
LOS: 1 days | Discharge: HOME | DRG: 072 | End: 2023-05-22
Attending: EMERGENCY MEDICINE | Admitting: INTERNAL MEDICINE
Payer: MEDICARE

## 2023-05-21 ENCOUNTER — OFFICE VISIT (OUTPATIENT)
Dept: URGENT CARE | Facility: CLINIC | Age: 78
End: 2023-05-21
Payer: MEDICARE

## 2023-05-21 VITALS
SYSTOLIC BLOOD PRESSURE: 150 MMHG | WEIGHT: 181 LBS | TEMPERATURE: 98 F | HEIGHT: 62 IN | HEART RATE: 88 BPM | RESPIRATION RATE: 18 BRPM | OXYGEN SATURATION: 98 % | DIASTOLIC BLOOD PRESSURE: 80 MMHG | BODY MASS INDEX: 33.31 KG/M2

## 2023-05-21 DIAGNOSIS — R93.0 ABNORMAL CT OF THE HEAD: ICD-10-CM

## 2023-05-21 DIAGNOSIS — R42 DIZZINESS: ICD-10-CM

## 2023-05-21 DIAGNOSIS — R07.9 CHEST PAIN: ICD-10-CM

## 2023-05-21 DIAGNOSIS — R07.89 CHEST DISCOMFORT: Primary | ICD-10-CM

## 2023-05-21 DIAGNOSIS — R20.2 TINGLING OF BOTH UPPER EXTREMITIES: ICD-10-CM

## 2023-05-21 DIAGNOSIS — R20.2 PARESTHESIA: Primary | ICD-10-CM

## 2023-05-21 LAB
ALBUMIN SERPL-MCNC: 4.1 G/DL (ref 3.4–4.8)
ALBUMIN/GLOB SERPL: 1 RATIO (ref 1.1–2)
ALP SERPL-CCNC: 111 UNIT/L (ref 40–150)
ALT SERPL-CCNC: 30 UNIT/L (ref 0–55)
AST SERPL-CCNC: 36 UNIT/L (ref 5–34)
BASOPHILS # BLD AUTO: 0.04 X10(3)/MCL
BASOPHILS NFR BLD AUTO: 0.4 %
BILIRUBIN DIRECT+TOT PNL SERPL-MCNC: 0.5 MG/DL
BUN SERPL-MCNC: 13.7 MG/DL (ref 9.8–20.1)
CALCIUM SERPL-MCNC: 9.9 MG/DL (ref 8.4–10.2)
CHLORIDE SERPL-SCNC: 106 MMOL/L (ref 98–107)
CO2 SERPL-SCNC: 21 MMOL/L (ref 23–31)
CREAT SERPL-MCNC: 1.06 MG/DL (ref 0.55–1.02)
EOSINOPHIL # BLD AUTO: 0.01 X10(3)/MCL (ref 0–0.9)
EOSINOPHIL NFR BLD AUTO: 0.1 %
ERYTHROCYTE [DISTWIDTH] IN BLOOD BY AUTOMATED COUNT: 13.2 % (ref 11.5–17)
GFR SERPLBLD CREATININE-BSD FMLA CKD-EPI: 54 MLS/MIN/1.73/M2
GLOBULIN SER-MCNC: 4.1 GM/DL (ref 2.4–3.5)
GLUCOSE SERPL-MCNC: 103 MG/DL (ref 82–115)
HCT VFR BLD AUTO: 46.5 % (ref 37–47)
HGB BLD-MCNC: 15.6 G/DL (ref 12–16)
IMM GRANULOCYTES # BLD AUTO: 0.04 X10(3)/MCL (ref 0–0.04)
IMM GRANULOCYTES NFR BLD AUTO: 0.4 %
LYMPHOCYTES # BLD AUTO: 1.55 X10(3)/MCL (ref 0.6–4.6)
LYMPHOCYTES NFR BLD AUTO: 16.6 %
MCH RBC QN AUTO: 32.8 PG (ref 27–31)
MCHC RBC AUTO-ENTMCNC: 33.5 G/DL (ref 33–36)
MCV RBC AUTO: 97.7 FL (ref 80–94)
MONOCYTES # BLD AUTO: 0.45 X10(3)/MCL (ref 0.1–1.3)
MONOCYTES NFR BLD AUTO: 4.8 %
NEUTROPHILS # BLD AUTO: 7.24 X10(3)/MCL (ref 2.1–9.2)
NEUTROPHILS NFR BLD AUTO: 77.7 %
NRBC BLD AUTO-RTO: 0 %
PLATELET # BLD AUTO: 257 X10(3)/MCL (ref 130–400)
PMV BLD AUTO: 11.5 FL (ref 7.4–10.4)
POTASSIUM SERPL-SCNC: 4 MMOL/L (ref 3.5–5.1)
PROT SERPL-MCNC: 8.2 GM/DL (ref 5.8–7.6)
RBC # BLD AUTO: 4.76 X10(6)/MCL (ref 4.2–5.4)
SODIUM SERPL-SCNC: 138 MMOL/L (ref 136–145)
TROPONIN I SERPL-MCNC: <0.01 NG/ML (ref 0–0.04)
WBC # SPEC AUTO: 9.33 X10(3)/MCL (ref 4.5–11.5)

## 2023-05-21 PROCEDURE — 11000001 HC ACUTE MED/SURG PRIVATE ROOM

## 2023-05-21 PROCEDURE — 93005 ELECTROCARDIOGRAM TRACING: CPT

## 2023-05-21 PROCEDURE — 99203 OFFICE O/P NEW LOW 30 MIN: CPT | Mod: ,,, | Performed by: FAMILY MEDICINE

## 2023-05-21 PROCEDURE — 84484 ASSAY OF TROPONIN QUANT: CPT | Performed by: EMERGENCY MEDICINE

## 2023-05-21 PROCEDURE — 80053 COMPREHEN METABOLIC PANEL: CPT | Performed by: EMERGENCY MEDICINE

## 2023-05-21 PROCEDURE — 85025 COMPLETE CBC W/AUTO DIFF WBC: CPT | Performed by: EMERGENCY MEDICINE

## 2023-05-21 PROCEDURE — 99203 PR OFFICE/OUTPT VISIT, NEW, LEVL III, 30-44 MIN: ICD-10-PCS | Mod: ,,, | Performed by: FAMILY MEDICINE

## 2023-05-21 PROCEDURE — 99285 EMERGENCY DEPT VISIT HI MDM: CPT | Mod: 25

## 2023-05-21 NOTE — ED PROVIDER NOTES
"Encounter Date: 5/21/2023    SCRIBE #1 NOTE: I, Jasmyn Hough, am scribing for, and in the presence of,  Gissel Larose MD. I have scribed the following portions of the note - the EKG reading. Other sections scribed: HPI, ROS, PE.     History     Chief Complaint   Patient presents with    Chest Pain     Pt c/o "chest pressure." Recently taken off eliquis this past week for treatment of "blood clot." Pt given 324mg aspirin with EMS. Brought from [facility].      77 year old white female with a remote history of left breast cancer treated by lumpectomy without know reoccurrence came to ED with several complaints: states all morning she noted a 'pulsing sensation from her heart beat in her eyes and then noted  tingling of upper arms and then noted both feet felt hot.  She has a history of pulmonary embolism 4  months ago that occurred 6 weeks after an appendectomy that was treated with Eliquis. She just saw Dr snow a few days ago who had repeated the CT chest: noted resolution of the PE so eliquis was discontinued .The scan also showed "nodules" in her lower lung (I dont' have report).  and the pt has an appointment with a pulmonologist, Dr. Rush, tomorrow.    When questioned about the complaint of chest pain on the triage note, she said, "OH, I've had a raw sensation in my chest like when you get a sore throat for a few days."  She went to urgent care and they sent her here.   No cough or congestion.  No syncope.  No rash.  No change in vision.  No headaches.  No focal weakness.    The history is provided by the patient. No  was used.   Illness   The current episode started 3 to 5 hours ago. The problem occurs rarely. The problem has been unchanged. Nothing relieves the symptoms. Nothing aggravates the symptoms.   Review of patient's allergies indicates:   Allergen Reactions    Hydrochlorothiazide Rash    Ketoconazole Rash     Past Medical History:   Diagnosis Date    Acute " appendicitis with localized peritonitis, without perforation, abscess, or gangrene 12/10/2022    Formatting of this note might be different from the original. Added automatically from request for surgery [id number]  Last Assessment & Plan:  Formatting of this note might be different from the original. 1.5 weeks s/p lap appy  - pathology reviewed- c/w diagnosis - RUE ultrasound ordered for arm swelling - will call with results - RTC prn    Hypertension      Past Surgical History:   Procedure Laterality Date    APPENDECTOMY  12/10/2022    BREAST LUMPECTOMY      KNEE ARTHROSCOPY      LIPOMA RESECTION       Family History   Problem Relation Age of Onset    Liver cancer Mother     Heart disease Father     Hypertension Sister      Social History     Tobacco Use    Smoking status: Never    Smokeless tobacco: Never   Substance Use Topics    Alcohol use: Not Currently    Drug use: Never     Review of Systems   Constitutional: Negative.    HENT: Negative.     Eyes: Negative.    Respiratory: Negative.     Cardiovascular: Negative.    Gastrointestinal: Negative.    Endocrine: Negative.    Genitourinary: Negative.    Musculoskeletal: Negative.    Skin: Negative.    Allergic/Immunologic: Negative.    Neurological: Negative.    Hematological: Negative.    Psychiatric/Behavioral: Negative.     All other systems reviewed and are negative.    Physical Exam     Initial Vitals [05/21/23 1303]   BP Pulse Resp Temp SpO2   (!) 185/90 83 16 97.7 °F (36.5 °C) 98 %      MAP       --         Physical Exam    Nursing note and vitals reviewed.  Constitutional: She appears well-developed and well-nourished. She does not appear ill. No distress.   HENT:   Head: Normocephalic and atraumatic.   Right Ear: Tympanic membrane normal.   Left Ear: Tympanic membrane normal.   Mouth/Throat: Oropharynx is clear and moist. No oropharyngeal exudate or posterior oropharyngeal erythema.   Eyes: Conjunctivae and EOM are normal. Pupils are equal, round, and  reactive to light.   Neck: Neck supple. No tracheal tenderness present. Carotid bruit is not present.   Cardiovascular:  Normal rate and regular rhythm.           Pulmonary/Chest: Breath sounds normal. No respiratory distress.   Abdominal: Abdomen is soft. Bowel sounds are normal. There is no abdominal tenderness.   Musculoskeletal:         General: Normal range of motion.      Cervical back: Neck supple.     Neurological: She is alert and oriented to person, place, and time.   Skin: Skin is warm, dry and intact. Capillary refill takes less than 2 seconds. No cyanosis.       ED Course   Procedures  Labs Reviewed   COMPREHENSIVE METABOLIC PANEL - Abnormal; Notable for the following components:       Result Value    Carbon Dioxide 21 (*)     Creatinine 1.06 (*)     Protein Total 8.2 (*)     Globulin 4.1 (*)     Albumin/Globulin Ratio 1.0 (*)     Aspartate Aminotransferase 36 (*)     All other components within normal limits   CBC WITH DIFFERENTIAL - Abnormal; Notable for the following components:    MCV 97.7 (*)     MCH 32.8 (*)     MPV 11.5 (*)     All other components within normal limits   TROPONIN I - Normal   CBC W/ AUTO DIFFERENTIAL    Narrative:     The following orders were created for panel order CBC auto differential.  Procedure                               Abnormality         Status                     ---------                               -----------         ------                     CBC with Differential[[id number]]        Abnormal            Final result                 Please view results for these tests on the individual orders.     EKG Readings: (Independently Interpreted)   Initial Reading: No STEMI. Rhythm: Normal Sinus Rhythm. Heart Rate: 81.   Time: 1310     Imaging Results              CT Head Without Contrast (Final result)  Result time 05/21/23 14:57:02      Final result by Hector Pickens MD (05/21/23 14:57:02)                   Impression:      Nonspecific area of hypodensity in the left  frontal lobe with no evidence for edema.  Recommend MRI of the brain with without contrast to further evaluate.      Electronically signed by: Hector Pickens MD  Date:    05/21/2023  Time:    14:57               Narrative:    EXAMINATION:  CT HEAD WITHOUT CONTRAST    CLINICAL HISTORY:  Neuro deficit, acute, stroke suspected;    TECHNIQUE:  Low dose axial CT images obtained throughout the head without intravenous contrast. Sagittal and coronal reconstructions were performed.  An automated dose exposure technique was utilized this limits radiation does the patient.    COMPARISON:  None.    FINDINGS:  Within the left frontal lobe a nonspecific area of hypodensity is identified without evidence for edema.  An area of linear hyperdensity is identified which could represent calcification.  No other area of hypodensity is identified.  Changes of microangiopathy identified.  No evidence for intraparenchymal hemorrhage or contusion.  No mass effect midline shift, edema, or extra-axial fluid collection.  The gray-white matter differentiation maintained.  Changes of microangiopathy.  No evidence for hydrocephalus.    The globes are intact.  The paranasal sinuses and mastoid air cells are well pneumatized.                                       X-Ray Chest PA And Lateral (Final result)  Result time 05/21/23 14:10:48      Final result by Camilla Burgos MD (05/21/23 14:10:48)                   Impression:      Trace right pleural effusion.      Electronically signed by: Camilla Burgos  Date:    05/21/2023  Time:    14:10               Narrative:    EXAMINATION:  XR CHEST PA AND LATERAL    CLINICAL HISTORY:  Chest pain, unspecified    TECHNIQUE:  PA and lateral chest radiographs    COMPARISON:  Chest x-ray dated 05/11/2023    FINDINGS:  The heart is normal in size.  The lungs are clear.  There is trace right pleural effusion.  There is no visible pneumothorax.                                       Medications - No data to  display           Scribe Attestation:   Scribe #1: I performed the above scribed service and the documentation accurately describes the services I performed. I attest to the accuracy of the note.    Attending Attestation:           Physician Attestation for Scribe:  Physician Attestation Statement for Scribe #1: I, Gissel Larose MD, reviewed documentation, as scribed by Jasmyn Hough in my presence, and it is both accurate and complete.           ED Course as of 05/21/23 1745   Sun May 21, 2023   1744 With an abnormal head CT although I can not quite make it correlate with her symptomatology but patient and daughter wanted to be admitted so I spoke to Dr. Templeton and she will be admitted to the [facility] service for full workup to include MRI etc. [RD]      ED Course User Index  [RD] Gissel Larose MD                 Clinical Impression:   Final diagnoses:  [R07.9] Chest pain  [R20.2] Paresthesia (Primary)  [R93.0] Abnormal CT of the head        ED Disposition Condition    Admit Stable                Gissel Larose MD  05/21/23 1747

## 2023-05-21 NOTE — PROGRESS NOTES
"Subjective:      Patient ID: Neeru Watson is a 77 y.o. female.    Vitals:  height is 5' 2" (1.575 m) and weight is 82.1 kg (181 lb). Her temperature is 97.9 °F (36.6 °C). Her blood pressure is 150/80 (abnormal) and her pulse is 88. Her respiration is 18 and oxygen saturation is 98%.     Chief Complaint: No chief complaint on file.        77-year-old female presents to clinic stating she woke up this morning with blurred vision and a throbbing pulsating sensation in the bilateral eyes.  States she is having tingling and numbness sensation in the bilateral upper extremities with chest and back pain with breathing.  Patient states she stopped Eliquis a few days ago.  History of pulmonary embolism.  Patient states she is dizzy feels like the room is spinning.    Constitution: Negative.   HENT: Negative.     Cardiovascular:  Positive for chest pain.   Eyes: Negative.    Respiratory: Negative.     Gastrointestinal: Negative.    Genitourinary: Negative.    Musculoskeletal:  Positive for back pain.   Skin: Negative.    Allergic/Immunologic: Negative.    Neurological:  Positive for dizziness, numbness and tingling.   Hematologic/Lymphatic: Negative.     Objective:     Physical Exam   Constitutional: She is oriented to person, place, and time.  Non-toxic appearance. She does not appear ill. No distress.   Eyes: Conjunctivae are normal.   Cardiovascular: Normal rate.   Pulmonary/Chest: Effort normal and breath sounds normal.   Abdominal: Normal appearance.   Neurological: no focal deficit. She is alert and oriented to person, place, and time. No sensory deficit.   Skin: Skin is not diaphoretic.   Psychiatric: Her behavior is normal. Mood, judgment and thought content normal.   Vitals reviewed.       Previous History      Review of patient's allergies indicates:   Allergen Reactions    Hydrochlorothiazide Rash    Ketoconazole Rash       Past Medical History:   Diagnosis Date    Acute appendicitis with localized peritonitis, " "without perforation, abscess, or gangrene 12/10/2022    Formatting of this note might be different from the original. Added automatically from request for surgery [id number]  Last Assessment & Plan:  Formatting of this note might be different from the original. 1.5 weeks s/p lap appy  - pathology reviewed- c/w diagnosis - RUE ultrasound ordered for arm swelling - will call with results - RTC prn    Hypertension      Current Outpatient Medications   Medication Instructions    acetaminophen (TYLENOL) 1,000 mg, Oral, Every 6 hours PRN    apixaban (ELIQUIS) 5 mg, Oral, 2 times daily    lisinopriL 10 MG tablet TAKE 1 TABLET BY MOUTH EVERY DAY    STELARA 45 mg/0.5 mL Syrg syringe Every 3 months     Past Surgical History:   Procedure Laterality Date    APPENDECTOMY  12/10/2022    BREAST LUMPECTOMY      KNEE ARTHROSCOPY      LIPOMA RESECTION       Family History   Problem Relation Age of Onset    Liver cancer Mother     Heart disease Father     Hypertension Sister        Social History     Tobacco Use    Smoking status: Never    Smokeless tobacco: Never   Substance Use Topics    Alcohol use: Not Currently    Drug use: Never        Physical Exam      Vital Signs Reviewed   BP (!) 150/80   Pulse 88   Temp 97.9 °F (36.6 °C)   Resp 18   Ht 5' 2" (1.575 m)   Wt 82.1 kg (181 lb)   SpO2 98%   BMI 33.11 kg/m²        Procedures    Procedures     Labs     Results for orders placed or performed in visit on 03/21/23   Wound Culture    Specimen: Breast; Wound   Result Value Ref Range    Wound Culture Normal Skin Rebecca, no further workup.        Assessment:     1. Chest discomfort    2. Tingling of both upper extremities    3. Dizziness        Plan:   Recommend further evaluation in the emergency department of your choice.  I do advise and recommend to go by EMS.      Patient was advised to go to the emergency department via EMS given her stroke-like symptoms.  This was declined and said she would go by private car however while " walking to her car in the parking lot her chest pain worsened.  EMS was called and patient was transported to the ER    Chest discomfort    Tingling of both upper extremities    Dizziness

## 2023-05-22 VITALS
WEIGHT: 180 LBS | HEIGHT: 63 IN | OXYGEN SATURATION: 98 % | BODY MASS INDEX: 31.89 KG/M2 | HEART RATE: 91 BPM | SYSTOLIC BLOOD PRESSURE: 148 MMHG | TEMPERATURE: 98 F | DIASTOLIC BLOOD PRESSURE: 73 MMHG | RESPIRATION RATE: 20 BRPM

## 2023-05-22 PROBLEM — R20.2 PARESTHESIA: Status: ACTIVE | Noted: 2023-05-22

## 2023-05-22 PROBLEM — R93.0 ABNORMAL HEAD CT: Status: ACTIVE | Noted: 2023-05-22

## 2023-05-22 LAB
CREAT SERPL-MCNC: 0.99 MG/DL (ref 0.55–1.02)
DLCO ADJ PRE: 13.39 ML/(MIN*MMHG) (ref 12.53–24)
DLCO SINGLE BREATH LLN: 12.53
DLCO SINGLE BREATH PRE REF: 73.3 %
DLCO SINGLE BREATH REF: 18.27
DLCOC SBVA LLN: 2.53
DLCOC SBVA PRE REF: 96.4 %
DLCOC SBVA REF: 4.11
DLCOC SINGLE BREATH LLN: 12.53
DLCOC SINGLE BREATH PRE REF: 73.3 %
DLCOC SINGLE BREATH REF: 18.27
DLCOCSBVAULN: 5.7
DLCOCSINGLEBREATHULN: 24
DLCOSINGLEBREATHULN: 24
DLCOVA LLN: 2.53
DLCOVA PRE REF: 96.4 %
DLCOVA PRE: 3.96 ML/(MIN*MMHG*L) (ref 2.53–5.7)
DLCOVA REF: 4.11
DLCOVAULN: 5.7
DLVAADJ PRE: 3.96 ML/(MIN*MMHG*L) (ref 2.53–5.7)
ERV LLN: -16449.5
ERV PRE REF: 48.5 %
ERV REF: 0.5
ERVULN: ABNORMAL
FEF 25 75 LLN: 0.63
FEF 25 75 PRE REF: 165.6 %
FEF 25 75 REF: 1.48
FEV1 FVC LLN: 63
FEV1 FVC PRE REF: 119.2 %
FEV1 FVC REF: 78
FEV1 LLN: 1.25
FEV1 PRE REF: 93.8 %
FEV1 REF: 1.77
FRCPLETH LLN: 1.73
FRCPLETH PREREF: 91.1 %
FRCPLETH REF: 2.55
FRCPLETHULN: 3.37
FVC LLN: 1.63
FVC PRE REF: 77.9 %
FVC REF: 2.31
GFR SERPLBLD CREATININE-BSD FMLA CKD-EPI: 58 MLS/MIN/1.73/M2
IVC PRE: 1.52 L (ref 1.63–3)
IVC SINGLE BREATH LLN: 1.63
IVC SINGLE BREATH PRE REF: 65.7 %
IVC SINGLE BREATH REF: 2.31
IVCSINGLEBREATHULN: 3
LLN IC: -16448.43
MVV LLN: 53
MVV PRE REF: 89 %
MVV REF: 63
PEF LLN: 2.98
PEF PRE REF: 76.2 %
PEF REF: 4.53
PRE DLCO: 13.39 ML/(MIN*MMHG) (ref 12.53–24)
PRE ERV: 0.24 L (ref -16449.5–16450.5)
PRE FEF 25 75: 2.45 L/S (ref 0.63–2.34)
PRE FET 100: 2.48 SEC
PRE FEV1 FVC: 92.39 % (ref 62.94–92.08)
PRE FEV1: 1.66 L (ref 1.25–2.3)
PRE FRC PL: 2.32 L
PRE FVC: 1.8 L (ref 1.63–3)
PRE IC: 1.45 L (ref -16448.43–16451.57)
PRE MVV: 56 L/MIN (ref 53.47–72.33)
PRE PEF: 3.45 L/S (ref 2.98–6.09)
PRE REF IC: 92 %
PRE RV: 1.97 L (ref 1.48–2.63)
PRE TLC: 3.77 L (ref 3.45–5.43)
PRE VTG: 2.63 L
RAW PRE REF: 81.6 %
RAW PRE: 2.5 CMH2O*S/L (ref 3.06–3.06)
RAW REF: 3.06
REF IC: 1.57
RV LLN: 1.48
RV PRE REF: 96 %
RV REF: 2.05
RVTLC LLN: 36
RVTLC PRE REF: 114.9 %
RVTLC PRE: 52.27 % (ref 35.89–55.07)
RVTLC REF: 45
RVTLCULN: 55
RVULN: 2.63
SGAW PRE REF: 169.2 %
SGAW PRE: 0.17 1/(CMH2O*S) (ref 0.1–0.1)
SGAW REF: 0.1
TLC LLN: 3.45
TLC PRE REF: 84.9 %
TLC REF: 4.44
TLC ULN: 5.43
ULN IC: ABNORMAL
VA PRE: 3.38 L (ref 4.29–4.29)
VA SINGLE BREATH PRE REF: 78.8 %
VA SINGLE BREATH REF: 4.29
VC LLN: 1.63
VC PRE REF: 77.9 %
VC PRE: 1.8 L (ref 1.63–3)
VC REF: 2.31
VC ULN: 3

## 2023-05-22 PROCEDURE — 94010 BREATHING CAPACITY TEST: CPT

## 2023-05-22 PROCEDURE — 25500020 PHARM REV CODE 255: Performed by: INTERNAL MEDICINE

## 2023-05-22 PROCEDURE — 99223 PR INITIAL HOSPITAL CARE,LEVL III: ICD-10-PCS | Mod: ,,, | Performed by: INTERNAL MEDICINE

## 2023-05-22 PROCEDURE — A9577 INJ MULTIHANCE: HCPCS | Performed by: INTERNAL MEDICINE

## 2023-05-22 PROCEDURE — 99238 PR HOSPITAL DISCHARGE DAY,<30 MIN: ICD-10-PCS | Mod: ,,, | Performed by: INTERNAL MEDICINE

## 2023-05-22 PROCEDURE — 94726 PLETHYSMOGRAPHY LUNG VOLUMES: CPT

## 2023-05-22 PROCEDURE — 94729 DIFFUSING CAPACITY: CPT

## 2023-05-22 PROCEDURE — 82565 ASSAY OF CREATININE: CPT | Performed by: INTERNAL MEDICINE

## 2023-05-22 PROCEDURE — 99223 1ST HOSP IP/OBS HIGH 75: CPT | Mod: ,,, | Performed by: INTERNAL MEDICINE

## 2023-05-22 PROCEDURE — 99238 HOSP IP/OBS DSCHRG MGMT 30/<: CPT | Mod: ,,, | Performed by: INTERNAL MEDICINE

## 2023-05-22 PROCEDURE — 25000003 PHARM REV CODE 250: Performed by: INTERNAL MEDICINE

## 2023-05-22 RX ORDER — LISINOPRIL 10 MG/1
10 TABLET ORAL DAILY
Status: DISCONTINUED | OUTPATIENT
Start: 2023-05-22 | End: 2023-05-22 | Stop reason: HOSPADM

## 2023-05-22 RX ORDER — LORAZEPAM 1 MG/1
1 TABLET ORAL
Status: DISCONTINUED | OUTPATIENT
Start: 2023-05-22 | End: 2023-05-22 | Stop reason: HOSPADM

## 2023-05-22 RX ORDER — ACETAMINOPHEN 500 MG
1000 TABLET ORAL EVERY 6 HOURS PRN
Status: DISCONTINUED | OUTPATIENT
Start: 2023-05-22 | End: 2023-05-22 | Stop reason: HOSPADM

## 2023-05-22 RX ADMIN — LISINOPRIL 10 MG: 10 TABLET ORAL at 08:05

## 2023-05-22 RX ADMIN — GADOBENATE DIMEGLUMINE 20 ML: 529 INJECTION, SOLUTION INTRAVENOUS at 01:05

## 2023-05-22 NOTE — DISCHARGE SUMMARY
[facility]  Hospital Medicine  Discharge Summary      Patient Name: Neeru Watson  MRN: [id number]  [facility]: [id number]  Patient Class: IP- Inpatient  Admission Date: 5/21/2023  Hospital Length of Stay: 1 days  Discharge Date and Time:  05/22/2023 2:57 PM  Attending Physician: Melanie Manrique MD   Discharging Provider: Melanie Manrique MD  Primary Care Provider: Melanie Manrique MD    Primary Care Team: Networked reference to record PCT     HPI:   No notes on file    * No surgery found *      Hospital Course:   Ms. Watson was admitted because her work up in the ER revealed an abnormal head ct.  An MRI was recommended.  The MRI showed a likely asymmetric area of microangiopathic ischemic change but also recommended a f/u MRI with contrast to r/o a low grade glioma.  Dr. Echeverria did evaluate her for the abnormal chest ct and ordered PFT.  The final PFT interpretation is pending at the time of discharge and can be addressed at her f/u appt.  He did not feel the CT findings were overly concerning but did recommended a f/u CT in a few mos.  This can be ordered as an outpatient.  The patient's symptoms improved and she feel relatively well on the day of discharge.  She will be discharged to f/u on the above issue at my office f/u in about a week.       Goals of Care Treatment Preferences:  Code Status: Full Code      Consults:     No new Assessment & Plan notes have been filed under this hospital service since the last note was generated.  Service: Hospital Medicine    Final Active Diagnoses:    Diagnosis Date Noted POA    PRINCIPAL PROBLEM:  Abnormal head CT [R93.0] 05/22/2023 Yes    Paresthesia [R20.2] 05/22/2023 Yes    Plaque psoriasis [L40.0] 01/31/2023 Yes    Pleuritic chest pain [R07.81] 01/19/2023 Yes    Hypertension [I10] 05/20/2022 Yes      Problems Resolved During this Admission:       Discharged Condition: stable    Disposition: Home or Self Care    Follow Up:   Follow-up Information      Melanie Manrique MD Follow up in 1 week(s).    Specialty: Internal Medicine  Contact information:  [address] [place] [address] [id number] [phone]                       Patient Instructions:      Activity as tolerated       Significant Diagnostic Studies: Radiology: MRI: results reviewed.    Pending Diagnostic Studies:     None         Medications:  Reconciled Home Medications:      Medication List      CONTINUE taking these medications    lisinopriL 10 MG tablet  TAKE 1 TABLET BY MOUTH EVERY DAY     STELARA 45 mg/0.5 mL Syrg syringe  Generic drug: ustekinumab  every 3 (three) months.        STOP taking these medications    acetaminophen 500 MG tablet  Commonly known as: TYLENOL            Indwelling Lines/Drains at time of discharge:   Lines/Drains/Airways     None                 Time spent on the discharge of patient: 15 minutes         Melanie Manrique MD  Department of Hospital Medicine  [facility] - Emergency Dept

## 2023-05-22 NOTE — H&P
Chief complaint:  Pulsatile bulging of the eyes and numbness of the arms.  Along with chronic mild right-sided pleuritic chest pain.      The patient is a 77-year-old woman who underwent appendectomy about 6 months ago.  A few weeks later she developed shortness of breath and presumably pleuritic chest pain.  Workup revealed pulmonary emboli and she was treated with Eliquis.  The pleuritic chest pain persisted although it did improve.  She saw Dr. Manrique recently in the office and a repeat CT scan of the chest revealed no evidence of PE.  There were notes of areas of fibrosis and also some small tiny nodules.  She was to be referred to Pulmonary Medicine for further evaluation.  However yesterday she developed a pulsatile bulging or fullness of the eyes associated with numbness in both arms.  She became concerned and went to a walk-in clinic.  They sent her to the emergency room where she was assessed and admitted to our service.  Currently she feels okay with no true discomfort except for the occasional right-sided pleuritic chest pain.  As stated above the pain has improved over the past few months.  It has not escalated over the past few days.  There has been no shortness of breath and no cough or wheezing.  There has been no hemoptysis.  There has been no anginal-type chest pain.  There has been no fever chills.    Home meds include Stelara for psoriasis and lisinopril for hypertension.      Allergies to hydrochlorothiazide and ketoconazole which I think was given topically     Past medical history includes hypertension.  She also has a remote history of breast cancer treated with lumpectomy and radiation but nothing else.  This was about a dozen years ago.  She is about 6 months status post appendectomy for acute appendicitis.  She is had arthroscopic knee surgery.  She has above-mentioned plaque psoriasis.  She is been on Stelara for about 2 years     She is a lifelong nonsmoker.  She used to drink occasional  alcohol but none now     She lives at home and remains active.  She is a  times 39 years.  She is retired she does walk with a cane because if her knee problems    Family history noncontributory    Review of systems notable for weight being stable but excessive.  No fevers chills or sweats.  No unusual headaches.  No dysphagia.  No anginal-type chest pain.  No orthopnea PND or pedal edema.  No cough wheezing asthma.  No nausea or vomiting or change in bowel habits.  No urgency frequency dysuria.      Physical exam:  Vital signs stable.  Blood pressure 123/62.  However at time of admission it was elevated at 185/90.  Heart rate 68 respiratory rate 14 O2 sat 94% room air.    General appearance is unremarkable.  She is a stocky elderly woman in no acute distress.  She is alert and appropriate.  HEENT reveals clear sclerae reactive pupils.  Throat clear.  Neck is supple with a small 1 cm nodule at the angle of the right jaw.  No clavicular adenopathy.  Heart has a regular rate and rhythm without murmurs gallops rubs.  Lungs essentially clear.  Interestingly she had loud heart sounds at the left mid back area.  Abdomen nontender and without spinal splenomegaly.  Extremities without clubbing cyanosis and only trace pretibial edema.  Foot pulses are intact.  Neuro exam unremarkable.    Laboratory studies include an unremarkable CBC and chemistry profile.  Troponin normal.  Chest x-ray reveals a small effusion right.  CT head revealed left frontal lobe hypodensity of unknown significance with MRI recommended.    Assessment:  1.  Unusual presenting symptoms of a nonspecific nature including a pulsatile bulging of her eyes along with numbness of both arms.  Workup unrevealing except for questionable lesion left frontal lobe on CT scanning.  TIA has been considered.  Also consider hypertensive type of event.    2. Hypertension.  Currently stable and usually well controlled    3. Persistent right-sided pleuritic chest  pain.  Began about 6 months ago with a pulmonary embolus and has improved but not resolved.  No evidence of PE by recent CT scanning     4. Multiple small nodules on chest CT scan and also areas of possible fibrosis.  Etiology unclear.    5. History of breast cancer without evidence of recurrence    6. History of plaque psoriasis on Stelara    7. Overweight    Plan:  We will continue to observe her for further symptoms and proceed with MRI of the brain and also Pulmonary Medicine consult.  There be no restrictions in activity.

## 2023-05-22 NOTE — CONSULTS
[facility] - Emergency Dept  Pulmonary Critical Care Note    Patient Name: Neeru Watson  MRN: [id number]  Admission Date: 5/21/2023  Hospital Length of Stay: 1 days  Code Status: Prior  Attending Provider: Last Arce MD  Primary Care Provider: Melanie Manrique MD     Subjective:     HPI:   77-year-old woman presented to the emergency room with numbness and tingling in her arms along with some visual disturbances.  She was initially seen in [facility] clinic and referred to the emergency room for possible stroke.  Workup is ongoing for that.  Pulmonary consult was placed to evaluate areas of fibrosis seen on CT chest.  Patient had an appendectomy in January of this year and postoperatively developed a pulmonary embolus.  She was treated with Eliquis which was recently stopped.  Patient describes an area of soreness in her right posterior chest that has been present since her pulmonary embolus.  She denies cough or shortness of breath and has had no fever or chills.          Past Medical History:   Diagnosis Date    Acute appendicitis with localized peritonitis, without perforation, abscess, or gangrene 12/10/2022    Formatting of this note might be different from the original. Added automatically from request for surgery [id number]  Last Assessment & Plan:  Formatting of this note might be different from the original. 1.5 weeks s/p lap appy  - pathology reviewed- c/w diagnosis - RUE ultrasound ordered for arm swelling - will call with results - RTC prn    Hypertension    Breast cancer in the past treated with left lumpectomy and radiation therapy.    Past Surgical History:   Procedure Laterality Date    APPENDECTOMY  12/10/2022    BREAST LUMPECTOMY      KNEE ARTHROSCOPY      LIPOMA RESECTION         Social History     Socioeconomic History    Marital status:    Tobacco Use    Smoking status: Never    Smokeless tobacco: Never   Substance and Sexual Activity    Alcohol use: Not Currently     Drug use: Never    Sexual activity: Not Currently           Current Outpatient Medications   Medication Instructions    acetaminophen (TYLENOL) 1,000 mg, Oral, Every 6 hours PRN    lisinopriL 10 MG tablet TAKE 1 TABLET BY MOUTH EVERY DAY    STELARA 45 mg/0.5 mL Syrg syringe Every 3 months       Current Inpatient Medications   lisinopriL  10 mg Oral Daily           Review of Systems   All other systems reviewed and are negative.       Objective:     No intake or output data in the 24 hours ending 05/22/23 0836      Vital Signs (Most Recent):  Temp: 97.7 °F (36.5 °C) (05/21/23 1303)  Pulse: 72 (05/22/23 0738)  Resp: (!) 21 (05/22/23 0738)  BP: 135/70 (05/22/23 0738)  SpO2: 98 % (05/22/23 0738)  Body mass index is 31.89 kg/m².  Weight: 81.6 kg (180 lb) Vital Signs (24h Range):  Temp:  [97.7 °F (36.5 °C)-97.9 °F (36.6 °C)] 97.7 °F (36.5 °C)  Pulse:  [68-92] 72  Resp:  [14-23] 21  SpO2:  [94 %-100 %] 98 %  BP: (123-185)/(62-90) 135/70     Physical Exam  Vitals reviewed.   Constitutional:       Appearance: She is obese. She is not ill-appearing.   HENT:      Mouth/Throat:      Pharynx: Oropharynx is clear. No oropharyngeal exudate or posterior oropharyngeal erythema.   Eyes:      General: No scleral icterus.     Conjunctiva/sclera: Conjunctivae normal.   Cardiovascular:      Rate and Rhythm: Normal rate and regular rhythm.      Heart sounds: No murmur heard.    No gallop.   Pulmonary:      Effort: Pulmonary effort is normal.      Breath sounds: Normal breath sounds. No wheezing, rhonchi or rales.   Abdominal:      General: Bowel sounds are normal. There is no distension.   Lymphadenopathy:      Cervical: No cervical adenopathy.   Skin:     General: Skin is warm and dry.   Neurological:      General: No focal deficit present.      Mental Status: She is alert and oriented to person, place, and time.         Lines/Drains/Airways       Peripheral Intravenous Line  Duration                  Peripheral IV - Single Lumen  05/21/23 1340 20 G Right Antecubital <1 day                    Significant Labs:    Lab Results   Component Value Date    WBC 9.33 05/21/2023    HGB 15.6 05/21/2023    HCT 46.5 05/21/2023    MCV 97.7 (H) 05/21/2023     05/21/2023         BMP  Lab Results   Component Value Date     05/21/2023    K 4.0 05/21/2023    CHLORIDE 106 05/21/2023    CO2 21 (L) 05/21/2023    BUN 13.7 05/21/2023    CREATININE 0.99 05/22/2023    CALCIUM 9.9 05/21/2023    EGFRNONAA >60 04/18/2022          Significant Imaging:  I have reviewed the pertinent imaging within the past 24 hours.  There is minimal areas of fibrosis in the lingula and left lower lobe.  I do not appreciate honeycombing as described by the radiologist.  Right upper lobe 5 mm nodule is unchanged from 2021.        Assessment/Plan:     Assessment  Minimal fibrosis by my review of the imaging studies.  This could be secondary to previous radiation therapy.  Unlikely to be an ILD.      Plan  Complete PFTs.  We will follow with you while hospitalized.  I would suggest additional imaging in 6-12 months.       CHAITANYA Echeverria MD  Pulmonary Critical Care Medicine  [facility] - Emergency Dept

## 2023-05-22 NOTE — HOSPITAL COURSE
Ms. Watson was admitted because her work up in the ER revealed an abnormal head ct.  An MRI was recommended.  The MRI showed a likely asymmetric area of microangiopathic ischemic change but also recommended a f/u MRI with contrast to r/o a low grade glioma.  Dr. Echeverria did evaluate her for the abnormal chest ct and ordered PFT.  The final PFT interpretation is pending at the time of discharge and can be addressed at her f/u appt.  He did not feel the CT findings were overly concerning but did recommended a f/u CT in a few mos.  This can be ordered as an outpatient.  The patient's symptoms improved and she feel relatively well on the day of discharge.  She will be discharged to f/u on the above issue at my office f/u in about a week.

## 2023-05-22 NOTE — PLAN OF CARE
05/22/23 1131   Discharge Assessment   Assessment Type Discharge Planning Assessment   Confirmed/corrected address, phone number and insurance Yes   Confirmed Demographics Correct on Facesheet   Source of Information patient   Communicated LILIAN with patient/caregiver Date not available/Unable to determine   Reason For Admission Pleuritic chest pain   People in Home alone  (Pt lives in a single story home with no steps to enter)   Do you expect to return to your current living situation? Yes   Do you have help at home or someone to help you manage your care at home? Yes  (Pt reports her daughter, Rosibel could help a little but not stay with her)   Who are your caregiver(s) and their phone number(s)? Rosibelsixto, [phone]-- a little help   Prior to hospitilization cognitive status: Alert/Oriented   Current cognitive status: Alert/Oriented   Walking or Climbing Stairs ambulation difficulty, requires equipment   Mobility Management She uses a 4 prong cane at times when her knee is hurting   Home Layout Able to live on 1st floor   Equipment Currently Used at Home cane, quad   Patient currently being followed by outpatient case management? No   Do you currently have service(s) that help you manage your care at home? No   Who is going to help you get home at discharge? Rosibel henson   How do you get to doctors appointments? car, drives self   Are you on dialysis? No   Discharge Plan A Home   Discharge Plan B Home Health   DME Needed Upon Discharge  other (see comments)  (TBD)   Discharge Plan discussed with: Patient   Transition of Care Barriers None   Housing Stability   In the last 12 months, was there a time when you were not able to pay the mortgage or rent on time? N   Transportation Needs   In the past 12 months, has lack of transportation kept you from medical appointments or from getting medications? no   Food Insecurity   Within the past 12 months, you worried that your food would run out before you got the money  "to buy more. Never true     Pt's PCP is Dr Manrique. Her  is her daughter, Rosibel ([phone]). She reports she has a "bad knee" so she uses a cane at times. She does drive and cleans her home. She uses [facility] pharmacy off of [address]. CM to follow for any dc needs.  "

## 2023-05-23 ENCOUNTER — PATIENT OUTREACH (OUTPATIENT)
Dept: ADMINISTRATIVE | Facility: CLINIC | Age: 78
End: 2023-05-23
Payer: MEDICARE

## 2023-05-23 NOTE — PROGRESS NOTES
C3 nurse spoke with Neeru Watson  for a TCC post hospital discharge follow up call. Requested call back in a couple of days. The patient has a scheduled HOSFU appointment with Melanie aMnrique MD  on 05/30/2023 @ 9 am.

## 2023-05-25 NOTE — PROGRESS NOTES
C3 nurse spoke with Neeru Watson  for a TCC post hospital discharge follow up call. The patient has a scheduled HOSFU appointment with Melanie Manrique MD  on 05/30/2023 @ 9 am.

## 2023-05-30 ENCOUNTER — OFFICE VISIT (OUTPATIENT)
Dept: INTERNAL MEDICINE | Facility: CLINIC | Age: 78
End: 2023-05-30
Payer: MEDICARE

## 2023-05-30 VITALS
DIASTOLIC BLOOD PRESSURE: 64 MMHG | SYSTOLIC BLOOD PRESSURE: 126 MMHG | OXYGEN SATURATION: 98 % | HEIGHT: 63 IN | HEART RATE: 85 BPM | BODY MASS INDEX: 31.89 KG/M2 | WEIGHT: 180 LBS | RESPIRATION RATE: 18 BRPM

## 2023-05-30 DIAGNOSIS — R20.2 PARESTHESIA: ICD-10-CM

## 2023-05-30 DIAGNOSIS — R07.81 PLEURITIC CHEST PAIN: ICD-10-CM

## 2023-05-30 DIAGNOSIS — R90.89 ABNORMAL FINDING ON MRI OF BRAIN: Primary | ICD-10-CM

## 2023-05-30 DIAGNOSIS — R93.0 ABNORMAL HEAD CT: ICD-10-CM

## 2023-05-30 PROCEDURE — 99495 TRANSJ CARE MGMT MOD F2F 14D: CPT | Mod: ,,, | Performed by: INTERNAL MEDICINE

## 2023-05-30 PROCEDURE — 99495 TCM SERVICES (MODERATE COMPLEXITY): ICD-10-PCS | Mod: ,,, | Performed by: INTERNAL MEDICINE

## 2023-05-30 NOTE — PROGRESS NOTES
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 Pabon and was on injections for the osteoporosis in the past.  She thinks the last bone density was in 2012.     Colon Polyps 5/20/2022   Vitamin D Deficiency 5/20/2022   Encounter for Medicare Annual Wellness Exam (Resolved) 1/31/2023   Acute Pulmonary Embolism (Resolved) 1/20/2023   Acute Appendicitis With Localized Peritonitis, Without Perforation, Abscess, Or Gangrene (Resolved) 12/10/2022    Formatting of this note might be different from the original.  Added automatically from request for surgery [id number]    Last Assessment & Plan:   Formatting of this note might be different from the original.  1.5 weeks s/p lap appy    - pathology reviewed- c/w diagnosis  - RUE ultrasound ordered for arm swelling  - will call with results  - RTC prn          The patient's Health Maintenance was reviewed and the following appears to be due:   Health Maintenance Due   Topic Date Due    COVID-19 Vaccine (1) Never done    Pneumococcal Vaccines (Age 65+) (1 - PCV) Never done    Shingles Vaccine (1 of 2) Never done    DEXA Scan  12/04/2018    TETANUS VACCINE  04/08/2023       Past Medical History:  Past Medical History:   Diagnosis Date    Acute appendicitis with localized peritonitis, without perforation, abscess, or gangrene 12/10/2022    Formatting of  "this note might be different from the original. Added automatically from request for surgery [id number]  Last Assessment & Plan:  Formatting of this note might be different from the original. 1.5 weeks s/p lap appy  - pathology reviewed- c/w diagnosis - RUE ultrasound ordered for arm swelling - will call with results - RTC prn    Hypertension      Review of patient's allergies indicates:   Allergen Reactions    Hydrochlorothiazide Rash    Ketoconazole Rash     Current Outpatient Medications on File Prior to Visit   Medication Sig Dispense Refill    lisinopriL 10 MG tablet TAKE 1 TABLET BY MOUTH EVERY DAY 90 tablet 3    STELARA 45 mg/0.5 mL Syrg syringe every 3 (three) months.       No current facility-administered medications on file prior to visit.       Review of Systems    Objective:   /64 (BP Location: Right arm, Patient Position: Sitting, BP Method: Small (Manual))   Pulse 85   Resp 18   Ht 5' 2.99" (1.6 m)   Wt 81.6 kg (180 lb)   SpO2 98%   BMI 31.89 kg/m²     Physical Exam  Assessment and Plan:     No problem-specific Assessment & Plan notes found for this encounter.      No follow-ups on file.       [unfilled]  No orders of the defined types were placed in this encounter.      "

## 2023-05-30 NOTE — PROGRESS NOTES
Subjective:      Chief Complaint: Hospital Follow Up (Went to ER on 5/21 her eyes kept blinking so she thought maybe she was having a heart attack/They found some nodules on her lungs )      HPI:She is here for f/u ER presentation for some dizziness/numbness and rt poster back discomfort.  She still has the back discomfort.  But she hasn't had any more dizziness, numbness, etc.  Denies headaches.      Transitional Care Note    Family and/or Caretaker present at visit?  No.  Diagnostic tests reviewed/disposition: I have reviewed all completed as well as pending diagnostic tests at the time of discharge.  Disease/illness education: We discussed the MRI results.  Home health/community services discussion/referrals: Patient does not have home health established from hospital visit.  They do not need home health.  If needed, we will set up home health for the patient.   Establishment or re-establishment of referral orders for community resources: No other necessary community resources.   Discussion with other health care providers: No discussion with other health care providers necessary.             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 Pabon and was on injections for the osteoporosis in the past.  She thinks the last bone density was in 2012.     Colon Polyps 5/20/2022   Vitamin D Deficiency 5/20/2022   Encounter for Medicare Annual Wellness Exam (Resolved) 1/31/2023   Acute Pulmonary Embolism (Resolved) 1/20/2023   Acute Appendicitis With Localized Peritonitis, Without  Perforation, Abscess, Or Gangrene (Resolved) 12/10/2022    Formatting of this note might be different from the original.  Added automatically from request for surgery [id number]    Last Assessment & Plan:   Formatting of this note might be different from the original.  1.5 weeks s/p lap appy    - pathology reviewed- c/w diagnosis  - RUE ultrasound ordered for arm swelling  - will call with results  - RTC prn          The patient's Health Maintenance was reviewed and the following appears to be due:   Health Maintenance Due   Topic Date Due    COVID-19 Vaccine (1) Never done    Pneumococcal Vaccines (Age 65+) (1 - PCV) Never done    Shingles Vaccine (1 of 2) Never done    DEXA Scan  12/04/2018    TETANUS VACCINE  04/08/2023       Past Medical History:  Past Medical History:   Diagnosis Date    Acute appendicitis with localized peritonitis, without perforation, abscess, or gangrene 12/10/2022    Formatting of this note might be different from the original. Added automatically from request for surgery [id number]  Last Assessment & Plan:  Formatting of this note might be different from the original. 1.5 weeks s/p lap appy  - pathology reviewed- c/w diagnosis - RUE ultrasound ordered for arm swelling - will call with results - RTC prn    Hypertension      Past Surgical History:   Procedure Laterality Date    APPENDECTOMY  12/10/2022    BREAST LUMPECTOMY      KNEE ARTHROSCOPY      LIPOMA RESECTION       Review of patient's allergies indicates:   Allergen Reactions    Hydrochlorothiazide Rash    Ketoconazole Rash     Current Outpatient Medications on File Prior to Visit   Medication Sig Dispense Refill    lisinopriL 10 MG tablet TAKE 1 TABLET BY MOUTH EVERY DAY 90 tablet 3    STELARA 45 mg/0.5 mL Syrg syringe every 3 (three) months.       No current facility-administered medications on file prior to visit.     Social History     Socioeconomic History    Marital status:    Tobacco Use    Smoking status: Never    Smokeless  "tobacco: Never   Substance and Sexual Activity    Alcohol use: Not Currently    Drug use: Never    Sexual activity: Not Currently     Social Determinants of Health     Food Insecurity: Unknown    Worried About Running Out of Food in the Last Year: Never true   Transportation Needs: Unknown    Lack of Transportation (Medical): No   Housing Stability: Unknown    Unable to Pay for Housing in the Last Year: No     Family History   Problem Relation Age of Onset    Liver cancer Mother     Heart disease Father     Hypertension Sister        Review of Systems    Objective:   /64 (BP Location: Right arm, Patient Position: Sitting, BP Method: Small (Manual))   Pulse 85   Resp 18   Ht 5' 2.99" (1.6 m)   Wt 81.6 kg (180 lb)   SpO2 98%   BMI 31.89 kg/m²     Physical Exam  Vitals reviewed.   Constitutional:       General: She is not in acute distress.     Appearance: Normal appearance. She is not ill-appearing or diaphoretic.   HENT:      Head: Normocephalic and atraumatic.   Pulmonary:      Effort: Pulmonary effort is normal.   Skin:     General: Skin is warm and dry.   Neurological:      General: No focal deficit present.      Mental Status: She is alert.   Psychiatric:         Mood and Affect: Mood normal.         Behavior: Behavior normal.         Thought Content: Thought content normal.         Judgment: Judgment normal.     Assessment and Plan:     Abnormal head CT  MRI showed area of asymmetry.  I have ordered a f/u in 3 mos to reassess.    Pleuritic chest pain  She still has a small amount of fluid there.  We discussed that it may or may not resolve, but its unlikely, thought not completely out of the question, that it would worsen.      Paresthesia  Resolved.     No follow-ups on file.       [unfilled]  Orders Placed This Encounter   Procedures    MRI Brain W WO Contrast     Standing Status:   Future     Standing Expiration Date:   5/30/2024     Order Specific Question:   Does the patient have a pacemaker or " a defibrillator (Note: Some facilities may not be able to schedule an MRI for patients with pacemakers and defibrillators. You should contact your local radiology department to determine if this is the case.)?     Answer:   No     Order Specific Question:   Does the patient have an aneurysm or surgical clip, pump, nerve/brain stimulator, middle/inner ear prosthesis, or other metal implant or foreign object (bullet, shrapnel)? If they have a card related to their implant, ask them to bring it. Issues related to the implant may cause the MRI to be delayed.     Answer:   No     Order Specific Question:   Is the patient claustrophobic?     Answer:   No     Order Specific Question:   Will the patient require sedation?     Answer:   No     Order Specific Question:   Does the patient have any of the following conditions? Diabetes, History of Renal Disease or Hypertension requiring medical therapy?     Answer:   Yes     Order Specific Question:   May the Radiologist modify the order per protocol to meet the clinical needs of the patient?     Answer:   Yes     Order Specific Question:   Is this part of a Research Study?     Answer:   No     Order Specific Question:   Does the patient have on a skin patch for medication with aluminized backing?     Answer:   No    Creatinine, serum     Standing Status:   Future     Standing Expiration Date:   7/28/2024    Creatinine, serum     Standing Status:   Standing     Number of Occurrences:   1

## 2023-05-30 NOTE — ASSESSMENT & PLAN NOTE
She still has a small amount of fluid there.  We discussed that it may or may not resolve, but its unlikely, thought not completely out of the question, that it would worsen.

## 2023-07-24 ENCOUNTER — TELEPHONE (OUTPATIENT)
Dept: INTERNAL MEDICINE | Facility: CLINIC | Age: 78
End: 2023-07-24
Payer: MEDICARE

## 2023-07-24 NOTE — TELEPHONE ENCOUNTER
----- Message from Belle Salazar LPN sent at 7/24/2023  8:37 AM CDT -----  Regarding: PV Dr. Manrique 731/23 @0900  Are there any outstanding tasks in the chart? Pt will need fasting labs    Is there any documentation of tasks? no    Has patient seen another physician, been to the ER, [facility], or admitted to hospital since last visit?    Has the patient done blood work or imaging since last visit?

## 2023-07-26 ENCOUNTER — LAB VISIT (OUTPATIENT)
Dept: LAB | Facility: HOSPITAL | Age: 78
End: 2023-07-26
Attending: INTERNAL MEDICINE
Payer: MEDICARE

## 2023-07-26 DIAGNOSIS — E55.9 VITAMIN D DEFICIENCY: ICD-10-CM

## 2023-07-26 DIAGNOSIS — E78.5 HYPERLIPIDEMIA, UNSPECIFIED HYPERLIPIDEMIA TYPE: ICD-10-CM

## 2023-07-26 DIAGNOSIS — R73.09 ABNORMAL BLOOD SUGAR: ICD-10-CM

## 2023-07-26 DIAGNOSIS — I10 PRIMARY HYPERTENSION: ICD-10-CM

## 2023-07-26 LAB
ALBUMIN SERPL-MCNC: 3.5 G/DL (ref 3.4–4.8)
ALBUMIN/GLOB SERPL: 1.1 RATIO (ref 1.1–2)
ALP SERPL-CCNC: 99 UNIT/L (ref 40–150)
ALT SERPL-CCNC: 31 UNIT/L (ref 0–55)
AST SERPL-CCNC: 28 UNIT/L (ref 5–34)
BASOPHILS # BLD AUTO: 0.04 X10(3)/MCL
BASOPHILS NFR BLD AUTO: 0.4 %
BILIRUBIN DIRECT+TOT PNL SERPL-MCNC: 0.9 MG/DL
BUN SERPL-MCNC: 18.2 MG/DL (ref 9.8–20.1)
CALCIUM SERPL-MCNC: 9.2 MG/DL (ref 8.4–10.2)
CHLORIDE SERPL-SCNC: 108 MMOL/L (ref 98–107)
CHOLEST SERPL-MCNC: 219 MG/DL
CHOLEST/HDLC SERPL: 3 {RATIO} (ref 0–5)
CO2 SERPL-SCNC: 25 MMOL/L (ref 23–31)
CREAT SERPL-MCNC: 1 MG/DL (ref 0.55–1.02)
DEPRECATED CALCIDIOL+CALCIFEROL SERPL-MC: 48.3 NG/ML (ref 30–80)
EOSINOPHIL # BLD AUTO: 0.16 X10(3)/MCL (ref 0–0.9)
EOSINOPHIL NFR BLD AUTO: 1.7 %
ERYTHROCYTE [DISTWIDTH] IN BLOOD BY AUTOMATED COUNT: 13.9 % (ref 11.5–17)
EST. AVERAGE GLUCOSE BLD GHB EST-MCNC: 108.3 MG/DL
GFR SERPLBLD CREATININE-BSD FMLA CKD-EPI: 57 MLS/MIN/1.73/M2
GLOBULIN SER-MCNC: 3.1 GM/DL (ref 2.4–3.5)
GLUCOSE SERPL-MCNC: 101 MG/DL (ref 82–115)
HBA1C MFR BLD: 5.4 %
HCT VFR BLD AUTO: 42.9 % (ref 37–47)
HDLC SERPL-MCNC: 70 MG/DL (ref 35–60)
HGB BLD-MCNC: 14.1 G/DL (ref 12–16)
IMM GRANULOCYTES # BLD AUTO: 0.07 X10(3)/MCL (ref 0–0.04)
IMM GRANULOCYTES NFR BLD AUTO: 0.7 %
LDLC SERPL CALC-MCNC: 129 MG/DL (ref 50–140)
LYMPHOCYTES # BLD AUTO: 3.06 X10(3)/MCL (ref 0.6–4.6)
LYMPHOCYTES NFR BLD AUTO: 32.3 %
MCH RBC QN AUTO: 32.4 PG (ref 27–31)
MCHC RBC AUTO-ENTMCNC: 32.9 G/DL (ref 33–36)
MCV RBC AUTO: 98.6 FL (ref 80–94)
MONOCYTES # BLD AUTO: 0.62 X10(3)/MCL (ref 0.1–1.3)
MONOCYTES NFR BLD AUTO: 6.5 %
NEUTROPHILS # BLD AUTO: 5.52 X10(3)/MCL (ref 2.1–9.2)
NEUTROPHILS NFR BLD AUTO: 58.4 %
NRBC BLD AUTO-RTO: 0 %
PLATELET # BLD AUTO: 220 X10(3)/MCL (ref 130–400)
PMV BLD AUTO: 10.4 FL (ref 7.4–10.4)
POTASSIUM SERPL-SCNC: 4.7 MMOL/L (ref 3.5–5.1)
PROT SERPL-MCNC: 6.6 GM/DL (ref 5.8–7.6)
RBC # BLD AUTO: 4.35 X10(6)/MCL (ref 4.2–5.4)
SODIUM SERPL-SCNC: 140 MMOL/L (ref 136–145)
TRIGL SERPL-MCNC: 100 MG/DL (ref 37–140)
VLDLC SERPL CALC-MCNC: 20 MG/DL
WBC # SPEC AUTO: 9.47 X10(3)/MCL (ref 4.5–11.5)

## 2023-07-26 PROCEDURE — 80053 COMPREHEN METABOLIC PANEL: CPT

## 2023-07-26 PROCEDURE — 36415 COLL VENOUS BLD VENIPUNCTURE: CPT

## 2023-07-26 PROCEDURE — 83036 HEMOGLOBIN GLYCOSYLATED A1C: CPT

## 2023-07-26 PROCEDURE — 85025 COMPLETE CBC W/AUTO DIFF WBC: CPT

## 2023-07-26 PROCEDURE — 80061 LIPID PANEL: CPT

## 2023-07-26 PROCEDURE — 82306 VITAMIN D 25 HYDROXY: CPT

## 2023-07-31 ENCOUNTER — OFFICE VISIT (OUTPATIENT)
Dept: INTERNAL MEDICINE | Facility: CLINIC | Age: 78
End: 2023-07-31
Payer: MEDICARE

## 2023-07-31 VITALS
RESPIRATION RATE: 18 BRPM | WEIGHT: 177 LBS | BODY MASS INDEX: 31.36 KG/M2 | HEIGHT: 63 IN | SYSTOLIC BLOOD PRESSURE: 134 MMHG | HEART RATE: 89 BPM | OXYGEN SATURATION: 99 % | DIASTOLIC BLOOD PRESSURE: 72 MMHG

## 2023-07-31 DIAGNOSIS — M81.0 AGE-RELATED OSTEOPOROSIS WITHOUT CURRENT PATHOLOGICAL FRACTURE: ICD-10-CM

## 2023-07-31 DIAGNOSIS — R93.0 ABNORMAL HEAD CT: ICD-10-CM

## 2023-07-31 DIAGNOSIS — I10 PRIMARY HYPERTENSION: ICD-10-CM

## 2023-07-31 DIAGNOSIS — R07.81 PLEURITIC CHEST PAIN: ICD-10-CM

## 2023-07-31 DIAGNOSIS — E55.9 VITAMIN D DEFICIENCY: ICD-10-CM

## 2023-07-31 PROCEDURE — 99214 OFFICE O/P EST MOD 30 MIN: CPT | Mod: ,,, | Performed by: INTERNAL MEDICINE

## 2023-07-31 PROCEDURE — 99214 PR OFFICE/OUTPT VISIT, EST, LEVL IV, 30-39 MIN: ICD-10-PCS | Mod: ,,, | Performed by: INTERNAL MEDICINE

## 2023-07-31 RX ORDER — CHOLECALCIFEROL (VITAMIN D3) 25 MCG
1000 TABLET ORAL DAILY
COMMUNITY

## 2023-07-31 NOTE — PROGRESS NOTES
Subjective:      Chief Complaint: Follow-up (6mo/Discuss labs )      HPI: She is ehre for f/u htn.  She still feels a pressure in her rt lung base.  She did see the pulmonologist who rec f/u in 9 mo with a repeat CT.  She decided she doesn't want to repeat the MRI of her brain.    She isn't checking her BP at home.  She can't exercise.  She had a steroid injection in her knee.  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 Pabon and was on injections for the osteoporosis in the past.  She thinks the last bone density was in 2012.  She has declined a repeat bmd     Colon Polyps 5/20/2022   Vitamin D Deficiency 5/20/2022   Encounter for Medicare Annual Wellness Exam (Resolved) 1/31/2023   Acute Pulmonary Embolism (Resolved) 1/20/2023   Acute Appendicitis With Localized Peritonitis, Without Perforation, Abscess, Or Gangrene (Resolved) 12/10/2022    Formatting of this note might be different from the original.  Added automatically from request for surgery [id number]    Last Assessment & Plan:   Formatting of this note might be different from the original.  1.5 weeks s/p lap appy    - pathology reviewed- c/w diagnosis  - RUE ultrasound ordered for arm swelling  - will call with results  - RTC prn          The patient's Health Maintenance was reviewed and the following appears to be due:   Health Maintenance Due   Topic Date Due    COVID-19 Vaccine (1) Never done    Pneumococcal Vaccines (Age 65+) (1 - PCV) Never done    Shingles Vaccine (1 of 2) Never done    DEXA Scan  12/04/2018     "TETANUS VACCINE  04/08/2023       Past Medical History:  Past Medical History:   Diagnosis Date    Acute appendicitis with localized peritonitis, without perforation, abscess, or gangrene 12/10/2022    Formatting of this note might be different from the original. Added automatically from request for surgery [id number]  Last Assessment & Plan:  Formatting of this note might be different from the original. 1.5 weeks s/p lap appy  - pathology reviewed- c/w diagnosis - RUE ultrasound ordered for arm swelling - will call with results - RTC prn    Hypertension      Review of patient's allergies indicates:   Allergen Reactions    Hydrochlorothiazide Rash    Ketoconazole Rash     Current Outpatient Medications on File Prior to Visit   Medication Sig Dispense Refill    lisinopriL 10 MG tablet TAKE 1 TABLET BY MOUTH EVERY DAY 90 tablet 3    STELARA 45 mg/0.5 mL Syrg syringe every 3 (three) months.      vitamin D (VITAMIN D3) 1000 units Tab Take 1,000 Units by mouth once daily.       No current facility-administered medications on file prior to visit.       Review of Systems    Objective:   /72 (BP Location: Right arm, Patient Position: Sitting, BP Method: Small (Manual))   Pulse 89   Resp 18   Ht 5' 2.91" (1.598 m)   Wt 80.3 kg (177 lb)   SpO2 99%   BMI 31.44 kg/m²     Physical Exam  Constitutional:       General: She is not in acute distress.     Appearance: Normal appearance.   HENT:      Head: Normocephalic and atraumatic.   Eyes:      General: No scleral icterus.     Conjunctiva/sclera: Conjunctivae normal.   Neck:      Vascular: No carotid bruit.   Cardiovascular:      Rate and Rhythm: Normal rate and regular rhythm.      Pulses: Normal pulses.      Heart sounds: Normal heart sounds. No murmur heard.     No friction rub. No gallop.   Pulmonary:      Effort: Pulmonary effort is normal.      Breath sounds: Normal breath sounds.   Abdominal:      General: Bowel sounds are normal.      Palpations: Abdomen is soft. " There is no mass.      Tenderness: There is no abdominal tenderness. There is no guarding or rebound.   Musculoskeletal:         General: No deformity.      Cervical back: No rigidity or tenderness.      Right lower leg: No edema.      Left lower leg: No edema.   Lymphadenopathy:      Cervical: No cervical adenopathy.   Skin:     Coloration: Skin is not jaundiced or pale.      Findings: No erythema.   Neurological:      General: No focal deficit present.      Mental Status: She is alert and oriented to person, place, and time.      Gait: Gait normal.   Psychiatric:         Mood and Affect: Mood normal.         Behavior: Behavior normal.         Thought Content: Thought content normal.         Judgment: Judgment normal.       Assessment and Plan:     Abnormal head CT  She decided against repeating the MRI.  She understands that there could be a tumor there, but she wants to wait and see if she has sx.  She is going to think about it a little more.    Pleuritic chest pain  Plan is to repeat CT with pulmonary in about 6-7 mos.    Hypertension  Stable, continue lisinopril.    Vitamin D deficiency  At goal, cont vit d 1000 units daily.      Follow up in about 6 months (around 1/31/2024).       [unfilled]  No orders of the defined types were placed in this encounter.

## 2023-07-31 NOTE — ASSESSMENT & PLAN NOTE
She decided against repeating the MRI.  She understands that there could be a tumor there, but she wants to wait and see if she has sx.  She is going to think about it a little more.

## 2023-09-04 NOTE — ASSESSMENT & PLAN NOTE
Repeat u/a  
Sounds like she could have a pneumonia of the RLL.  Will get labs and cxr to further evaluate.  
PAST SURGICAL HISTORY:  No significant past surgical history

## 2023-10-01 ENCOUNTER — HOSPITAL ENCOUNTER (INPATIENT)
Facility: HOSPITAL | Age: 78
LOS: 2 days | Discharge: HOME | DRG: 176 | End: 2023-10-03
Attending: STUDENT IN AN ORGANIZED HEALTH CARE EDUCATION/TRAINING PROGRAM | Admitting: INTERNAL MEDICINE
Payer: MEDICARE

## 2023-10-01 DIAGNOSIS — R06.02 SOB (SHORTNESS OF BREATH): ICD-10-CM

## 2023-10-01 DIAGNOSIS — Z00.00 PE (PHYSICAL EXAM), ANNUAL: ICD-10-CM

## 2023-10-01 DIAGNOSIS — I26.99 PULMONARY EMBOLISM: Primary | ICD-10-CM

## 2023-10-01 DIAGNOSIS — I26.99 BILATERAL PULMONARY EMBOLISM: ICD-10-CM

## 2023-10-01 LAB
ALBUMIN SERPL-MCNC: 3.8 G/DL (ref 3.4–4.8)
ALBUMIN/GLOB SERPL: 0.9 RATIO (ref 1.1–2)
ALP SERPL-CCNC: 111 UNIT/L (ref 40–150)
ALT SERPL-CCNC: 12 UNIT/L (ref 0–55)
APTT PPP: 25.4 SECONDS (ref 23.2–33.7)
AST SERPL-CCNC: 30 UNIT/L (ref 5–34)
BASOPHILS # BLD AUTO: 0.05 X10(3)/MCL
BASOPHILS NFR BLD AUTO: 0.4 %
BILIRUB SERPL-MCNC: 1 MG/DL
BNP BLD-MCNC: 15.7 PG/ML
BSA FOR ECHO PROCEDURE: 1.81 M2
BUN SERPL-MCNC: 9.9 MG/DL (ref 9.8–20.1)
CALCIUM SERPL-MCNC: 9.4 MG/DL (ref 8.4–10.2)
CHLORIDE SERPL-SCNC: 104 MMOL/L (ref 98–107)
CO2 SERPL-SCNC: 19 MMOL/L (ref 23–31)
CREAT SERPL-MCNC: 0.98 MG/DL (ref 0.55–1.02)
CV ECHO LV RWT: 0.42 CM
D DIMER PPP IA.FEU-MCNC: 17.27 UG/ML FEU (ref 0–0.5)
ECHO LV POSTERIOR WALL: 0.92 CM (ref 0.6–1.1)
EOSINOPHIL # BLD AUTO: 0.02 X10(3)/MCL (ref 0–0.9)
EOSINOPHIL NFR BLD AUTO: 0.1 %
ERYTHROCYTE [DISTWIDTH] IN BLOOD BY AUTOMATED COUNT: 13.7 % (ref 11.5–17)
FLUAV AG UPPER RESP QL IA.RAPID: NOT DETECTED
FLUBV AG UPPER RESP QL IA.RAPID: NOT DETECTED
FRACTIONAL SHORTENING: 35 % (ref 28–44)
GFR SERPLBLD CREATININE-BSD FMLA CKD-EPI: 59 MLS/MIN/1.73/M2
GLOBULIN SER-MCNC: 4.1 GM/DL (ref 2.4–3.5)
GLUCOSE SERPL-MCNC: 126 MG/DL (ref 82–115)
HCT VFR BLD AUTO: 43 % (ref 37–47)
HGB BLD-MCNC: 14.4 G/DL (ref 12–16)
IMM GRANULOCYTES # BLD AUTO: 0.1 X10(3)/MCL (ref 0–0.04)
IMM GRANULOCYTES NFR BLD AUTO: 0.7 %
INR PPP: 1.1
INTERVENTRICULAR SEPTUM: 0.73 CM (ref 0.6–1.1)
LEFT INTERNAL DIMENSION IN SYSTOLE: 2.84 CM (ref 2.1–4)
LEFT VENTRICLE DIASTOLIC VOLUME INDEX: 49.03 ML/M2
LEFT VENTRICLE DIASTOLIC VOLUME: 86.3 ML
LEFT VENTRICLE MASS INDEX: 64 G/M2
LEFT VENTRICLE SYSTOLIC VOLUME INDEX: 17.4 ML/M2
LEFT VENTRICLE SYSTOLIC VOLUME: 30.6 ML
LEFT VENTRICULAR INTERNAL DIMENSION IN DIASTOLE: 4.37 CM (ref 3.5–6)
LEFT VENTRICULAR MASS: 112.68 G
LYMPHOCYTES # BLD AUTO: 1.14 X10(3)/MCL (ref 0.6–4.6)
LYMPHOCYTES NFR BLD AUTO: 8.1 %
MCH RBC QN AUTO: 32.7 PG (ref 27–31)
MCHC RBC AUTO-ENTMCNC: 33.5 G/DL (ref 33–36)
MCV RBC AUTO: 97.5 FL (ref 80–94)
MONOCYTES # BLD AUTO: 0.91 X10(3)/MCL (ref 0.1–1.3)
MONOCYTES NFR BLD AUTO: 6.5 %
NEUTROPHILS # BLD AUTO: 11.78 X10(3)/MCL (ref 2.1–9.2)
NEUTROPHILS NFR BLD AUTO: 84.2 %
NRBC BLD AUTO-RTO: 0 %
PISA TR MAX VEL: 2.97 M/S
PLATELET # BLD AUTO: 210 X10(3)/MCL (ref 130–400)
PMV BLD AUTO: 11 FL (ref 7.4–10.4)
POTASSIUM SERPL-SCNC: 4.9 MMOL/L (ref 3.5–5.1)
PROT SERPL-MCNC: 7.9 GM/DL (ref 5.8–7.6)
PROTHROMBIN TIME: 14 SECONDS (ref 12.5–14.5)
PV PEAK GRADIENT: 5 MMHG
PV PEAK VELOCITY: 1.07 M/S
RA PRESSURE ESTIMATED: 3 MMHG
RBC # BLD AUTO: 4.41 X10(6)/MCL (ref 4.2–5.4)
RIGHT VENTRICULAR END-DIASTOLIC DIMENSION: 2.38 CM
RV TB RVSP: 6 MMHG
SARS-COV-2 RNA RESP QL NAA+PROBE: NOT DETECTED
SODIUM SERPL-SCNC: 135 MMOL/L (ref 136–145)
TR MAX PG: 35 MMHG
TRICUSPID ANNULAR PLANE SYSTOLIC EXCURSION: 1.97 CM
TROPONIN I SERPL-MCNC: <0.01 NG/ML (ref 0–0.04)
TV REST PULMONARY ARTERY PRESSURE: 38 MMHG
WBC # SPEC AUTO: 14 X10(3)/MCL (ref 4.5–11.5)
Z-SCORE OF LEFT VENTRICULAR DIMENSION IN END DIASTOLE: -1.08
Z-SCORE OF LEFT VENTRICULAR DIMENSION IN END SYSTOLE: -0.46

## 2023-10-01 PROCEDURE — 63600175 PHARM REV CODE 636 W HCPCS: Performed by: INTERNAL MEDICINE

## 2023-10-01 PROCEDURE — 25000003 PHARM REV CODE 250: Performed by: INTERNAL MEDICINE

## 2023-10-01 PROCEDURE — 63600175 PHARM REV CODE 636 W HCPCS: Performed by: STUDENT IN AN ORGANIZED HEALTH CARE EDUCATION/TRAINING PROGRAM

## 2023-10-01 PROCEDURE — 99223 1ST HOSP IP/OBS HIGH 75: CPT | Mod: AI,,, | Performed by: INTERNAL MEDICINE

## 2023-10-01 PROCEDURE — 80053 COMPREHEN METABOLIC PANEL: CPT | Performed by: STUDENT IN AN ORGANIZED HEALTH CARE EDUCATION/TRAINING PROGRAM

## 2023-10-01 PROCEDURE — 99223 PR INITIAL HOSPITAL CARE,LEVL III: ICD-10-PCS | Mod: AI,,, | Performed by: INTERNAL MEDICINE

## 2023-10-01 PROCEDURE — 93010 ELECTROCARDIOGRAM REPORT: CPT | Mod: ,,, | Performed by: INTERNAL MEDICINE

## 2023-10-01 PROCEDURE — 83880 ASSAY OF NATRIURETIC PEPTIDE: CPT | Performed by: STUDENT IN AN ORGANIZED HEALTH CARE EDUCATION/TRAINING PROGRAM

## 2023-10-01 PROCEDURE — 85610 PROTHROMBIN TIME: CPT | Performed by: STUDENT IN AN ORGANIZED HEALTH CARE EDUCATION/TRAINING PROGRAM

## 2023-10-01 PROCEDURE — 11000001 HC ACUTE MED/SURG PRIVATE ROOM

## 2023-10-01 PROCEDURE — 85730 THROMBOPLASTIN TIME PARTIAL: CPT | Performed by: STUDENT IN AN ORGANIZED HEALTH CARE EDUCATION/TRAINING PROGRAM

## 2023-10-01 PROCEDURE — 99291 CRITICAL CARE FIRST HOUR: CPT

## 2023-10-01 PROCEDURE — 96374 THER/PROPH/DIAG INJ IV PUSH: CPT

## 2023-10-01 PROCEDURE — 85379 FIBRIN DEGRADATION QUANT: CPT | Performed by: STUDENT IN AN ORGANIZED HEALTH CARE EDUCATION/TRAINING PROGRAM

## 2023-10-01 PROCEDURE — 93005 ELECTROCARDIOGRAM TRACING: CPT

## 2023-10-01 PROCEDURE — 93010 EKG 12-LEAD: ICD-10-PCS | Mod: ,,, | Performed by: INTERNAL MEDICINE

## 2023-10-01 PROCEDURE — 84484 ASSAY OF TROPONIN QUANT: CPT | Performed by: STUDENT IN AN ORGANIZED HEALTH CARE EDUCATION/TRAINING PROGRAM

## 2023-10-01 PROCEDURE — 25500020 PHARM REV CODE 255: Performed by: STUDENT IN AN ORGANIZED HEALTH CARE EDUCATION/TRAINING PROGRAM

## 2023-10-01 PROCEDURE — 0240U COVID/FLU A&B PCR: CPT | Performed by: EMERGENCY MEDICINE

## 2023-10-01 PROCEDURE — 85025 COMPLETE CBC W/AUTO DIFF WBC: CPT | Performed by: STUDENT IN AN ORGANIZED HEALTH CARE EDUCATION/TRAINING PROGRAM

## 2023-10-01 PROCEDURE — 96375 TX/PRO/DX INJ NEW DRUG ADDON: CPT

## 2023-10-01 RX ORDER — SODIUM CHLORIDE 0.9 % (FLUSH) 0.9 %
10 SYRINGE (ML) INJECTION
Status: DISCONTINUED | OUTPATIENT
Start: 2023-10-01 | End: 2023-10-03 | Stop reason: HOSPADM

## 2023-10-01 RX ORDER — HEPARIN SODIUM,PORCINE/D5W 25000/250
0-40 INTRAVENOUS SOLUTION INTRAVENOUS CONTINUOUS
Status: DISCONTINUED | OUTPATIENT
Start: 2023-10-01 | End: 2023-10-01

## 2023-10-01 RX ORDER — ENOXAPARIN SODIUM 100 MG/ML
1 INJECTION SUBCUTANEOUS EVERY 12 HOURS
Status: DISCONTINUED | OUTPATIENT
Start: 2023-10-01 | End: 2023-10-01

## 2023-10-01 RX ORDER — HYDROMORPHONE HYDROCHLORIDE 2 MG/ML
0.5 INJECTION, SOLUTION INTRAMUSCULAR; INTRAVENOUS; SUBCUTANEOUS EVERY 4 HOURS PRN
Status: DISCONTINUED | OUTPATIENT
Start: 2023-10-01 | End: 2023-10-03 | Stop reason: HOSPADM

## 2023-10-01 RX ORDER — HYDROMORPHONE HYDROCHLORIDE 2 MG/ML
0.5 INJECTION, SOLUTION INTRAMUSCULAR; INTRAVENOUS; SUBCUTANEOUS
Status: COMPLETED | OUTPATIENT
Start: 2023-10-01 | End: 2023-10-01

## 2023-10-01 RX ORDER — ENOXAPARIN SODIUM 100 MG/ML
1 INJECTION SUBCUTANEOUS
Status: COMPLETED | OUTPATIENT
Start: 2023-10-01 | End: 2023-10-01

## 2023-10-01 RX ORDER — CHOLECALCIFEROL (VITAMIN D3) 25 MCG
1000 TABLET ORAL DAILY
Status: DISCONTINUED | OUTPATIENT
Start: 2023-10-02 | End: 2023-10-03 | Stop reason: HOSPADM

## 2023-10-01 RX ORDER — LISINOPRIL 10 MG/1
10 TABLET ORAL ONCE
Status: COMPLETED | OUTPATIENT
Start: 2023-10-01 | End: 2023-10-01

## 2023-10-01 RX ORDER — TALC
6 POWDER (GRAM) TOPICAL NIGHTLY PRN
Status: DISCONTINUED | OUTPATIENT
Start: 2023-10-01 | End: 2023-10-03 | Stop reason: HOSPADM

## 2023-10-01 RX ORDER — ENOXAPARIN SODIUM 100 MG/ML
70 INJECTION SUBCUTANEOUS EVERY 12 HOURS
Status: DISCONTINUED | OUTPATIENT
Start: 2023-10-01 | End: 2023-10-03 | Stop reason: HOSPADM

## 2023-10-01 RX ORDER — HYDROMORPHONE HYDROCHLORIDE 2 MG/ML
1 INJECTION, SOLUTION INTRAMUSCULAR; INTRAVENOUS; SUBCUTANEOUS EVERY 4 HOURS PRN
Status: DISCONTINUED | OUTPATIENT
Start: 2023-10-01 | End: 2023-10-03 | Stop reason: HOSPADM

## 2023-10-01 RX ORDER — ONDANSETRON 2 MG/ML
4 INJECTION INTRAMUSCULAR; INTRAVENOUS
Status: COMPLETED | OUTPATIENT
Start: 2023-10-01 | End: 2023-10-01

## 2023-10-01 RX ORDER — LISINOPRIL 10 MG/1
10 TABLET ORAL DAILY
Status: DISCONTINUED | OUTPATIENT
Start: 2023-10-02 | End: 2023-10-03 | Stop reason: HOSPADM

## 2023-10-01 RX ADMIN — IOPAMIDOL 100 ML: 755 INJECTION, SOLUTION INTRAVENOUS at 07:10

## 2023-10-01 RX ADMIN — ENOXAPARIN SODIUM 70 MG: 80 INJECTION SUBCUTANEOUS at 09:10

## 2023-10-01 RX ADMIN — HYDROMORPHONE HYDROCHLORIDE 0.5 MG: 2 INJECTION INTRAMUSCULAR; INTRAVENOUS; SUBCUTANEOUS at 07:10

## 2023-10-01 RX ADMIN — LISINOPRIL 10 MG: 10 TABLET ORAL at 06:10

## 2023-10-01 RX ADMIN — ONDANSETRON 4 MG: 2 INJECTION INTRAMUSCULAR; INTRAVENOUS at 07:10

## 2023-10-01 RX ADMIN — SODIUM CHLORIDE, POTASSIUM CHLORIDE, SODIUM LACTATE AND CALCIUM CHLORIDE 1000 ML: 600; 310; 30; 20 INJECTION, SOLUTION INTRAVENOUS at 09:10

## 2023-10-01 NOTE — ED PROVIDER NOTES
Encounter Date: 10/1/2023    SCRIBE #1 NOTE: I, Jamey Davison, am scribing for, and in the presence of,  Fransisco Royal MD. I have scribed the following portions of the note - Other sections scribed: HPI,ROS,PE.       History     Chief Complaint   Patient presents with    Shortness of Breath     SOB x1 day, denies CHF/COPD. States hx of PE, finished thinners.      79 y/o female with PMHx of PE and HTN presents to ED c/o lower chest pain onset 9/30. Pt reports the pain feels like a pressure and is worse to the left side. She reports associated symptoms of SOB that is worse with exertion. Pt reports she had rhinorrhea 2x weeks ago, that was resolved ~3x days ago. She denies any fever or chills. Daughter at bedside reports pt had an appendectomy ~1x year ago, and 6x weeks after the surgery she had a PE. Daughter states pt was put on eliquis for 4x months until the PE was resolved. She reports the pt had a CT scan done recently, and found 3x nodules in her lower lungs. Daughter states pt has similar symptoms when she had a PE.     The history is provided by the patient and a relative. No  was used.     Review of patient's allergies indicates:   Allergen Reactions    Hydrochlorothiazide Rash    Ketoconazole Rash     Past Medical History:   Diagnosis Date    Acute appendicitis with localized peritonitis, without perforation, abscess, or gangrene 12/10/2022    Formatting of this note might be different from the original. Added automatically from request for surgery [id number]  Last Assessment & Plan:  Formatting of this note might be different from the original. 1.5 weeks s/p lap appy  - pathology reviewed- c/w diagnosis - RUE ultrasound ordered for arm swelling - will call with results - RTC prn    Hypertension      Past Surgical History:   Procedure Laterality Date    APPENDECTOMY  12/10/2022    BREAST LUMPECTOMY      KNEE ARTHROSCOPY      LIPOMA RESECTION       Family History   Problem Relation Age  of Onset    Liver cancer Mother     Heart disease Father     Hypertension Sister      Social History     Tobacco Use    Smoking status: Never    Smokeless tobacco: Never   Substance Use Topics    Alcohol use: Not Currently    Drug use: Never     Review of Systems   Constitutional:  Negative for chills and fever.   HENT:  Positive for rhinorrhea.    Respiratory:  Positive for shortness of breath. Negative for cough.    Cardiovascular:  Positive for chest pain.       Physical Exam     Initial Vitals [10/01/23 0504]   BP Pulse Resp Temp SpO2   (!) 163/89 110 (!) 23 98.2 °F (36.8 °C) (!) 94 %      MAP       --         Physical Exam    Nursing note and vitals reviewed.  Constitutional: She appears well-developed and well-nourished. She is not diaphoretic. No distress.   Speaking in complete sentences    HENT:   Head: Normocephalic and atraumatic.   Right Ear: External ear normal.   Left Ear: External ear normal.   Nose: Nose normal.   Eyes: Conjunctivae and EOM are normal. Pupils are equal, round, and reactive to light. Right eye exhibits no discharge. Left eye exhibits no discharge.   Cardiovascular:  Normal rate, regular rhythm, normal heart sounds and intact distal pulses.     Exam reveals no gallop and no friction rub.       No murmur heard.  Pulmonary/Chest: No respiratory distress. She has no wheezes. She has no rhonchi. She has rales (crackles to left base). She exhibits tenderness (tenderness to palpation left lower chest).   Abdominal: Abdomen is soft. Bowel sounds are normal. She exhibits no distension and no mass. There is no abdominal tenderness. There is no rebound and no guarding.   Musculoskeletal:         General: No edema. Normal range of motion.     Neurological: She is alert and oriented to person, place, and time. No cranial nerve deficit or sensory deficit.   Skin: Skin is warm and dry. Capillary refill takes less than 2 seconds. No erythema. No pallor.         ED Course   Critical Care    Date/Time:  10/1/2023 8:42 AM    Performed by: Fransisco Royal MD  Authorized by: Fransisco Royal MD  Direct patient critical care time: 10 minutes  Additional history critical care time: 5 minutes  Ordering / reviewing critical care time: 6 minutes  Documentation critical care time: 6 minutes  Consulting other physicians critical care time: 5 minutes  Total critical care time (exclusive of procedural time) : 32 minutes  Critical care time was exclusive of separately billable procedures and treating other patients.  Critical care was necessary to treat or prevent imminent or life-threatening deterioration of the following conditions: circulatory failure and cardiac failure.  Critical care was time spent personally by me on the following activities: discussions with primary provider, development of treatment plan with patient or surrogate, interpretation of cardiac output measurements, evaluation of patient's response to treatment, examination of patient, ordering and performing treatments and interventions, obtaining history from patient or surrogate, ordering and review of laboratory studies, ordering and review of radiographic studies, pulse oximetry, re-evaluation of patient's condition and review of old charts.  Comments: Patient was pulmonary emboli requiring Lovenox, admission.        Labs Reviewed   COMPREHENSIVE METABOLIC PANEL - Abnormal; Notable for the following components:       Result Value    Sodium Level 135 (*)     Carbon Dioxide 19 (*)     Glucose Level 126 (*)     Protein Total 7.9 (*)     Globulin 4.1 (*)     Albumin/Globulin Ratio 0.9 (*)     All other components within normal limits   CBC WITH DIFFERENTIAL - Abnormal; Notable for the following components:    WBC 14.00 (*)     MCV 97.5 (*)     MCH 32.7 (*)     MPV 11.0 (*)     Neut # 11.78 (*)     IG# 0.10 (*)     All other components within normal limits   D DIMER, QUANTITATIVE - Abnormal; Notable for the following components:    D-Dimer 17.27 (*)      All other components within normal limits   TROPONIN I - Normal   B-TYPE NATRIURETIC PEPTIDE - Normal   PROTIME-INR - Normal   COVID/FLU A&B PCR - Normal    Narrative:     The Xpert Xpress SARS-CoV-2/FLU/RSV plus is a rapid, multiplexed real-time PCR test intended for the simultaneous qualitative detection and differentiation of SARS-CoV-2, Influenza A, Influenza B, and respiratory syncytial virus (RSV) viral RNA in either nasopharyngeal swab or nasal swab specimens.         CBC W/ AUTO DIFFERENTIAL    Narrative:     The following orders were created for panel order CBC auto differential.  Procedure                               Abnormality         Status                     ---------                               -----------         ------                     CBC with Differential[[id number]]       Abnormal            Final result                 Please view results for these tests on the individual orders.   APTT          Imaging Results              CTA Chest Non-Coronary (PE Studies) (Final result)  Result time 10/01/23 08:12:23      Final result by Ant Marcelo MD (10/01/23 08:12:23)                   Impression:      Bilateral pulmonary thrombus in the upper and lower pulmonary arteries bilaterally.    No CT evidence of right heart strain, recommend further evaluation with ECHO if needed.    Findings reported to Dr. Royal at the time of interpretation.      Electronically signed by: Ant Marcelo  Date:    10/01/2023  Time:    08:12               Narrative:    EXAMINATION:  CTA CHEST NON CORONARY (PE STUDIES)    CLINICAL HISTORY:  Pulmonary embolism (PE) suspected, positive D-dimer;    TECHNIQUE:  Axial images of the chest were obtained with contrast. Sagittal and coronal reconstructed images were available for review. 3-D MIP reconstructions were performed from source imaging.    Automatic dose control was utilized to reduce patient radiation dose.    DLP= 242    COMPARISON:  No prior imaging  available for comparison.    FINDINGS:  PULMONARY ARTERY: Bilateral pulmonary thrombus in the upper and lower pulmonary arteries bilaterally.    AORTA: Normal in course and caliber.    THYROID GLAND: The visualized thyroid is unremarkable.    AIRWAYS: Trachea is midline and tracheobronchial tree is patent.    HEART: The heart is normal in size. There is no pericardial effusion.  Mild coronary atherosclerotic disease noted.    LUNGS: No pleural effusion or pneumothorax.  Mild dependent subsegmental atelectatic changes noted.    LYMPH NODES: There is no significant mediastinal, axillary or hilar lymphadenopathy.    BONES: No displaced fracture. No aggressive appearing osseous lesion identified.    UPPER ABDOMEN:  The visualized abdomen is unremarkable.                                         X-Ray Chest AP Portable (Final result)  Result time 10/01/23 08:29:06      Final result by Ant Marcelo MD (10/01/23 08:29:06)                   Impression:      No acute cardiopulmonary process.  Prominent interstitial markings with no focal opacification.      Electronically signed by: Ant Marcelo  Date:    10/01/2023  Time:    08:29               Narrative:    EXAMINATION:  XR CHEST AP PORTABLE    CLINICAL HISTORY:  SOB;    TECHNIQUE:  Single view of the chest    COMPARISON:  05/21/2023    FINDINGS:  Prominent interstitial markings with no focal opacification.    The cardiomediastinal silhouette is within normal limits.    No acute osseous abnormality.                                       Medications   lactated ringers bolus 1,000 mL (has no administration in time range)   enoxaparin injection 70 mg (has no administration in time range)   sodium chloride 0.9% flush 10 mL (has no administration in time range)   melatonin tablet 6 mg (has no administration in time range)   HYDROmorphone (PF) injection 0.5 mg (has no administration in time range)   HYDROmorphone (PF) injection 1 mg (has no administration in time range)    HYDROmorphone (PF) injection 0.5 mg (0.5 mg Intravenous Given 10/1/23 6881)   ondansetron injection 4 mg (4 mg Intravenous Given 10/1/23 2787)   iopamidoL (ISOVUE-370) injection 100 mL (100 mLs Intravenous Given 10/1/23 9659)             Scribe Attestation:   Scribe #1: I performed the above scribed service and the documentation accurately describes the services I performed. I attest to the accuracy of the note.    Attending Attestation:           Physician Attestation for Scribe:  Physician Attestation Statement for Scribe #1: I, Fransisco Royal MD, reviewed documentation, as scribed by Jamey Davison in my presence, and it is both accurate and complete.         Medical Decision Making  Patient presents emergency department chief complaint of left lower chest pain    The differential diagnosis includes, but is not limited to, Chest pain emergent diagnoses: ACS, PE, dissection, cardiac tamponade, tension pneumothorax.  Chest pain non-emergent diagnoses: Musculoskeletal, trauma, pleurisy, pneumonia, pleural effusion, GERD, other GI, neurologic, psychiatric and other non emergent diagnoses considered.    Patient is awake alert.  Mildly uncomfortable appearing.  Not significantly dyspneic.  Mildly tachycardic on occasion.  Oxygen saturation greater than 95% on room air.  She reports a history of pulmonary emboli indeed had 1 back in January proximally 6-8 weeks after an appendectomy.  Here in the emergency department she was some crackles in her bases.  EKGs unrevealing.  BNP and troponin within normal limits.  CT scan shows bilateral pulmonary emboli in both upper and lower lobes.  No evidence of any right heart strain.  Discussed with Dr. Pan, on-call for patient's PCP Dr. Manrique asked that she be admitted, started on Lovenox.  Care will be transferred.         Amount and/or Complexity of Data Reviewed  Independent Historian: friend     Details: Daughter at bedside reports pt had an appendectomy ~1x year ago,  and 6x weeks after the surgery she had a PE. Daughter states pt was put on eliquis for 4x months until the PE was resolved. She reports the pt had a CT scan done recently, and found 3x nodules in her lower lungs. Daughter states pt has similar symptoms when she had a PE.   External Data Reviewed: notes.     Details: Chart review reveals patient was seen January 20th in this emergency department for similar.  Received Lovenox and was admitted with a pulmonary emboli.  Labs: ordered. Decision-making details documented in ED Course.  Radiology: ordered and independent interpretation performed. Decision-making details documented in ED Course.  ECG/medicine tests: ordered and independent interpretation performed. Decision-making details documented in ED Course.    Risk  OTC drugs.  Prescription drug management.  Decision regarding hospitalization.           ED Course as of 10/01/23 0843   Sun Oct 01, 2023   0550 EKG from 05/03 shows sinus tachycardia.  Rate of 104.  .  Normal axis.  No ST elevation or depression.   [MM]   0608 SARS-CoV2 (COVID-19) Qualitative PCR: Not Detected [MM]   0608 Influenza B, Molecular: Not Detected [MM]   0608 Influenza A, Molecular: Not Detected [MM]   0608 X-Ray Chest AP Portable  \slightly worsening opacity to left lower lung [MM]   0657 Troponin I: <0.010 [MM]   0658 INR: 1.1 [MM]   0658 BNP: 15.7 [MM]   0726 D-Dimer(!): 17.27 [MM]      ED Course User Index  [MM] Fransisco Royal MD                    Clinical Impression:   Final diagnoses:  [R06.02] SOB (shortness of breath)  [I26.99] Pulmonary embolism (Primary)        ED Disposition Condition    Admit Stable                Fransisco Royal MD  10/01/23 0844

## 2023-10-01 NOTE — H&P
Chief complaint:  Worsening left-sided chest pain.    The patient is a 78-year-old woman who presented the emergency room with pain in the left posterior lateral chest of a pleuritic nature.  It had been somewhat chronic in nature but much worse yesterday.  Perhaps it was worsening shortness of breath although currently he denies any shortness of breath.  He has a history of pulmonary emboli following appendectomy less than a year ago.  She was treated with anticoagulation for a few months.  She wanted to get off the Eliquis.  A repeat CT scan showed no evidence of persistent PE and stopped.  Several months ago.  It was felt the original PE was provoked and related to the appendectomy.    She is not had any recent cough or wheezing.  No hemoptysis.  No fever chills.  No recent changes in medication.    Current meds or lisinopril 10 daily.  Vitamin-D a 1000 IU daily.  And Stelara for psoriasis.    Past medical history notable for hypertension arthroscopic knee surgery plaque psoriasis and breast cancer.  She was treated with lumpectomy radiation but no chemo.  The surgery was on the left side.    She is a lifelong smoker.  She does not use alcohol    She lives at home active.  He has been a  for many years.    Family history noncontributory    Review of systems notable for obesity but stable weight.  The current weight reported is underestimates her prior weights which are usually closer to 180.  Again no fevers chills sweats.  No unusual headaches no dysphagia.  No thyroid disease diabetes.  No anginal-type chest pain.  Out new PND or pedal edema.  No leg discomfort in her knee arthritis.  No nausea or vomiting or change in bowel habits.  No urgency frequency or dysuria.    Physical exam:  She is afebrile.  Blood pressure most recently 170/99.  Heart rate 95 respiratory rate 23.  O2 sat 98% on 2 L nasal cannula.  General appearance is that of an elderly obese woman in no acute distress.  She is alert and  appropriate.  HEENT exam reveals sclerae clear reactive pupils.  Throat unremarkable.  Neck is supple without thyromegaly or adenopathy.  No jugular venous distention.  Heart has a regular rate and rhythm without murmurs gallops or rubs.  Lungs have rales of the left face.  Abdomen is nontender without mass or hepatosplenomegaly extremities without clubbing cyanosis and only trace bilateral pretibial edema.  Foot pulses are palpable.    Laboratory studies include an elevated white count 22564.  H&H is 1443.  INR is 1.1 and PTT 25.  D-dimer was markedly elevated at 17.  Chemistry profile was essentially unremarkable.  BNP normal and troponin normal.  Flu swab and COVID were negative.  Chest x-ray  showed prominent interstitial markings with no confluent infiltrates.  CTA chest revealed bilateral pulmonary MRI.  No mentioned of areas of nodularity discussed on prior scans.  Echocardiogram showed no evidence of right heart strain.  EKG reveals slight tachycardia but no acute findings.    Assessment:  1.  Acute bilateral pulmonary emboli.  These would be considered unprovoked.  She had a pulmonary emboli diagnosed several months ago a few weeks following appendectomy.    2. History of small pulmonary nodules of unknown significance.  Being followed by Pulmonary Medicine.    3. Hypertension.  Blood pressure elevated but she did not get her meds today     4. History of breast cancer.  No evidence of recurrence.    5. History of plaque psoriasis.  Stelara    6. Obesity.  Chronic but stable    Plan:  Full-dose Lovenox for initial treatment.  Eventually transitioned over to Eliquis.  Defer hypercoagulable workup to her regular treating physician.

## 2023-10-01 NOTE — Clinical Note
Diagnosis: SOB (shortness of breath) [375830]   Admitting Provider:: ANDREY WEAVER [[id number]]   Future Attending Provider: DANNI THOMAS [[id number]]   Reason for IP Medical Treatment  (Clinical interventions that can only be accomplished in the IP setting? ) :: Anti-coagulation   I certify that Inpatient services for greater than or equal to 2 midnights are medically necessary:: Yes   Plans for Post-Acute care--if anticipated (pick the single best option):: A. No post acute care anticipated at this time

## 2023-10-02 ENCOUNTER — TELEPHONE (OUTPATIENT)
Dept: INTERNAL MEDICINE | Facility: CLINIC | Age: 78
End: 2023-10-02
Payer: MEDICARE

## 2023-10-02 LAB
BNP BLD-MCNC: 23.8 PG/ML
TROPONIN I SERPL-MCNC: 0.01 NG/ML (ref 0–0.04)

## 2023-10-02 PROCEDURE — 11000001 HC ACUTE MED/SURG PRIVATE ROOM

## 2023-10-02 PROCEDURE — 84484 ASSAY OF TROPONIN QUANT: CPT | Performed by: INTERNAL MEDICINE

## 2023-10-02 PROCEDURE — 63600175 PHARM REV CODE 636 W HCPCS: Performed by: INTERNAL MEDICINE

## 2023-10-02 PROCEDURE — 99232 SBSQ HOSP IP/OBS MODERATE 35: CPT | Mod: ,,, | Performed by: INTERNAL MEDICINE

## 2023-10-02 PROCEDURE — 83880 ASSAY OF NATRIURETIC PEPTIDE: CPT | Performed by: INTERNAL MEDICINE

## 2023-10-02 PROCEDURE — 25000003 PHARM REV CODE 250: Performed by: INTERNAL MEDICINE

## 2023-10-02 PROCEDURE — 99232 PR SUBSEQUENT HOSPITAL CARE,LEVL II: ICD-10-PCS | Mod: ,,, | Performed by: INTERNAL MEDICINE

## 2023-10-02 PROCEDURE — 21400001 HC TELEMETRY ROOM

## 2023-10-02 RX ADMIN — Medication 1000 UNITS: at 08:10

## 2023-10-02 RX ADMIN — ENOXAPARIN SODIUM 70 MG: 80 INJECTION SUBCUTANEOUS at 08:10

## 2023-10-02 RX ADMIN — LISINOPRIL 10 MG: 10 TABLET ORAL at 08:10

## 2023-10-02 NOTE — TELEPHONE ENCOUNTER
----- Message from Giovana Hazel sent at 10/2/2023 11:12 AM CDT -----  .Type:  Patient Returning Call    Who Called:[facility]  Who Left Message for Patient:Silvia  Does the patient know what this is regarding?:  Would the patient rather a call back or a response via [facility]?   Best Call Back Number:[phone]  Additional Information: Patient is in room 958. [facility].

## 2023-10-02 NOTE — NURSING
Nurses Note -- 4 Eyes      10/2/2023   11:42 AM      Skin assessed during: Admit      [x] No Altered Skin Integrity Present    []Prevention Measures Documented      [] Yes- Altered Skin Integrity Present or Discovered   [] LDA Added if Not in Epic (Describe Wound)   [] New Altered Skin Integrity was Present on Admit and Documented in LDA   [] Wound Image Taken    Wound Care Consulted? No    Attending Nurse:  Harish Bautista LPN    Second RN/Staff Member:   Yocasta Hemphill RN

## 2023-10-02 NOTE — PLAN OF CARE
10/02/23 1155   Discharge Assessment   Assessment Type Discharge Planning Assessment   Confirmed/corrected address, phone number and insurance Yes   Confirmed Demographics Correct on Facesheet   Source of Information patient;family  (Rosibel Watson (Daughter)   [phone] (Mobile))   If unable to respond/provide information was family/caregiver contacted? Yes   Contact Name/Number Rosibel Watson (Daughter)   [phone] (Mobile)   Communicated LILIAN with patient/caregiver Date not available/Unable to determine   Reason For Admission  Shortness of Breath/SOB x1 day, denies CHF/COPD. States hx of PE, finished thinners. 79 y/o female with PMHx of PE and HTN presents to ED c/o lower chest pain onset 9/30. Pt reports the pain feels like a pressure and is worse to the left side. She reports associated symptoms of SOB that is worse with exertion. Pt reports she had rhinorrhea 2x weeks ago, that was resolved ~3x days ago   People in Home alone   Facility Arrived From: Private residence.   Do you expect to return to your current living situation? Yes   Do you have help at home or someone to help you manage your care at home? Yes   Who are your caregiver(s) and their phone number(s)? Rosibel Watson (Daughter)   [phone] (Mobile)   Prior to hospitilization cognitive status: Alert/Oriented   Current cognitive status: Alert/Oriented   Walking or Climbing Stairs ambulation difficulty, requires equipment   Mobility Management Patient does have a quad cane which she usues occasionally.   Home Accessibility   (The patient's home is built on a slab.)   Home Layout Able to live on 1st floor   Equipment Currently Used at Home cane, quad   Readmission within 30 days? No   Patient currently being followed by outpatient case management? No   Do you currently have service(s) that help you manage your care at home? No   Do you take prescription medications? Yes   Do you have prescription coverage? Yes   Coverage Payor:  MEDICARE - MEDICARE  PART A & B   Do you have any problems affording any of your prescribed medications? No   Is the patient taking medications as prescribed? yes   Who is going to help you get home at discharge? Rosibel Watson (Daughter)   [phone] (Mobile)   How do you get to doctors appointments? car, drives self   Are you on dialysis? No   Do you take coumadin? No   DME Needed Upon Discharge  none   Discharge Plan discussed with: Patient;Adult children  (Rosibel Watson (Daughter)   [phone] (Mobile))   Transition of Care Barriers None   Discharge Plan A Home Health  (Patient and her daughter are requesting a list of home health agencies to follow upon discharge home. List provided for review of home health agencies per [employer].)   Discharge Plan B Home Health   Social Connections   In a typical week, how many times do you talk on the phone with family, friends, or neighbors? Twice a week   How often do you get together with friends or relatives? Twice   How often do you attend Caodaism or Sikhism services? Never   Do you belong to any clubs or organizations such as Caodaism groups, unions, fraternal or athletic groups, or school groups? No   How often do you attend meetings of the clubs or organizations you belong to? Never   Are you , , , , never , or living with a partner?    Alcohol Use   Q1: How often do you have a drink containing alcohol? Never   Q2: How many drinks containing alcohol do you have on a typical day when you are drinking? None   Q3: How often do you have six or more drinks on one occasion? Never     Patient and her daughter: Rosibel Watson are requesting a list of home health agencies in her insurance net-work for review. She will review these agencies prior to selecting a preference for home health services.

## 2023-10-02 NOTE — PROGRESS NOTES
[facility] Hospital Medicine  Progress Note    Patient Name: Neeru Watson  MRN: [id number]  Patient Class: IP- Inpatient   Admission Date: 10/1/2023  Length of Stay: 1 days  Attending Physician: Melanie Manrique MD  Primary Care Provider: Melanie Manrique MD        Subjective:     Principal Problem:Bilateral pulmonary embolism        HPI:  No notes on file    Overview/Hospital Course:  No notes on file    Interval History: Ms. Watson has no new complaints to report.  Her IV infiltrate and was changed.  But the new one isn't working per nurse.  She is still having some pain on the left side when she takes a deep breath.    Review of Systems  Objective:     Vital Signs (Most Recent):  Temp: 98.6 °F (37 °C) (10/02/23 1103)  Pulse: 90 (10/02/23 1103)  Resp: (!) 21 (10/02/23 0819)  BP: 118/70 (10/02/23 1103)  SpO2: 95 % (10/02/23 1103) Vital Signs (24h Range):  Temp:  [98.6 °F (37 °C)] 98.6 °F (37 °C)  Pulse:  [75-99] 90  Resp:  [19-25] 21  SpO2:  [95 %-98 %] 95 %  BP: (112-170)/(64-99) 118/70     Weight: 74.8 kg (165 lb)  Body mass index is 30.18 kg/m².  No intake or output data in the 24 hours ending 10/02/23 1519      Physical Exam  Constitutional:       General: She is not in acute distress.     Appearance: Normal appearance.   HENT:      Head: Normocephalic and atraumatic.   Eyes:      General: No scleral icterus.     Conjunctiva/sclera: Conjunctivae normal.   Neck:      Vascular: No carotid bruit.   Cardiovascular:      Rate and Rhythm: Normal rate and regular rhythm.      Pulses: Normal pulses.      Heart sounds: Normal heart sounds. No murmur heard.     No friction rub. No gallop.   Pulmonary:      Effort: Pulmonary effort is normal.      Comments: Diminished left base with some rales at the left base.  O/W cta bilaterally.    Abdominal:      General: Bowel sounds are normal.      Palpations: Abdomen is soft. There is no mass.      Tenderness: There is no abdominal  tenderness. There is no guarding or rebound.   Musculoskeletal:         General: No deformity.      Cervical back: No rigidity or tenderness.      Right lower leg: Edema (minimal pitting RLE) present.      Left lower leg: No edema.   Lymphadenopathy:      Cervical: No cervical adenopathy.   Skin:     General: Skin is warm and dry.      Coloration: Skin is not jaundiced or pale.      Findings: No erythema.   Neurological:      General: No focal deficit present.      Mental Status: She is alert and oriented to person, place, and time.      Gait: Gait normal.   Psychiatric:         Mood and Affect: Mood normal.         Behavior: Behavior normal.         Thought Content: Thought content normal.         Judgment: Judgment normal.             Significant Labs: All pertinent labs within the past 24 hours have been reviewed.    Significant Imaging: I have reviewed all pertinent imaging results/findings within the past 24 hours.      Assessment/Plan:      * Bilateral pulmonary embolism  She is on lovenox now.  Will transition her to oral anticoagulation over the next day or two.  Echo doesn't show any rt heart strain.  Will check legs for DVT to determine clot burden.  She is still not feeling great -- hasn't had a chance to get up and move around.  And she is still having pain.  Will monitor overnight and consider discharge over the next day or two depending on how she feels.  Given that this is a second PE, plan will be for lifelong anticoagulation.      Hypertension    Stable, continue lisinopril.      VTE Risk Mitigation (From admission, onward)         Ordered     enoxaparin injection 70 mg  Every 12 hours         10/01/23 1702                Discharge Planning   LILIAN:      Code Status: Full Code   Is the patient medically ready for discharge?:     Reason for patient still in hospital (select all that apply): Patient trending condition  Discharge Plan A: Home Health (Patient and her daughter are requesting a list of home  health agencies to follow upon discharge home. List provided for review of home health agencies per [employer].)                  Melanie Manrique MD  Department of Hospital Medicine   [facility]

## 2023-10-02 NOTE — ASSESSMENT & PLAN NOTE
She is on lovenox now.  Will transition her to oral anticoagulation over the next day or two.  Echo doesn't show any rt heart strain.  Will check legs for DVT to determine clot burden.  She is still not feeling great -- hasn't had a chance to get up and move around.  And she is still having pain.  Will monitor overnight and consider discharge over the next day or two depending on how she feels.  Given that this is a second PE, plan will be for lifelong anticoagulation.

## 2023-10-02 NOTE — SUBJECTIVE & OBJECTIVE
Interval History: Ms. Watson has no new complaints to report.  Her IV infiltrate and was changed.  But the new one isn't working per nurse.  She is still having some pain on the left side when she takes a deep breath.    Review of Systems  Objective:     Vital Signs (Most Recent):  Temp: 98.6 °F (37 °C) (10/02/23 1103)  Pulse: 90 (10/02/23 1103)  Resp: (!) 21 (10/02/23 0819)  BP: 118/70 (10/02/23 1103)  SpO2: 95 % (10/02/23 1103) Vital Signs (24h Range):  Temp:  [98.6 °F (37 °C)] 98.6 °F (37 °C)  Pulse:  [75-99] 90  Resp:  [19-25] 21  SpO2:  [95 %-98 %] 95 %  BP: (112-170)/(64-99) 118/70     Weight: 74.8 kg (165 lb)  Body mass index is 30.18 kg/m².  No intake or output data in the 24 hours ending 10/02/23 1519      Physical Exam  Constitutional:       General: She is not in acute distress.     Appearance: Normal appearance.   HENT:      Head: Normocephalic and atraumatic.   Eyes:      General: No scleral icterus.     Conjunctiva/sclera: Conjunctivae normal.   Neck:      Vascular: No carotid bruit.   Cardiovascular:      Rate and Rhythm: Normal rate and regular rhythm.      Pulses: Normal pulses.      Heart sounds: Normal heart sounds. No murmur heard.     No friction rub. No gallop.   Pulmonary:      Effort: Pulmonary effort is normal.      Comments: Diminished left base with some rales at the left base.  O/W cta bilaterally.    Abdominal:      General: Bowel sounds are normal.      Palpations: Abdomen is soft. There is no mass.      Tenderness: There is no abdominal tenderness. There is no guarding or rebound.   Musculoskeletal:         General: No deformity.      Cervical back: No rigidity or tenderness.      Right lower leg: Edema (minimal pitting RLE) present.      Left lower leg: No edema.   Lymphadenopathy:      Cervical: No cervical adenopathy.   Skin:     General: Skin is warm and dry.      Coloration: Skin is not jaundiced or pale.      Findings: No erythema.   Neurological:      General: No focal deficit  present.      Mental Status: She is alert and oriented to person, place, and time.      Gait: Gait normal.   Psychiatric:         Mood and Affect: Mood normal.         Behavior: Behavior normal.         Thought Content: Thought content normal.         Judgment: Judgment normal.             Significant Labs: All pertinent labs within the past 24 hours have been reviewed.    Significant Imaging: I have reviewed all pertinent imaging results/findings within the past 24 hours.

## 2023-10-03 VITALS
HEART RATE: 87 BPM | DIASTOLIC BLOOD PRESSURE: 69 MMHG | RESPIRATION RATE: 20 BRPM | TEMPERATURE: 98 F | BODY MASS INDEX: 30.36 KG/M2 | WEIGHT: 165 LBS | HEIGHT: 62 IN | SYSTOLIC BLOOD PRESSURE: 151 MMHG | OXYGEN SATURATION: 96 %

## 2023-10-03 LAB
BASOPHILS # BLD AUTO: 0.07 X10(3)/MCL
BASOPHILS NFR BLD AUTO: 0.7 %
EOSINOPHIL # BLD AUTO: 0.08 X10(3)/MCL (ref 0–0.9)
EOSINOPHIL NFR BLD AUTO: 0.8 %
ERYTHROCYTE [DISTWIDTH] IN BLOOD BY AUTOMATED COUNT: 13.7 % (ref 11.5–17)
HCT VFR BLD AUTO: 41.7 % (ref 37–47)
HGB BLD-MCNC: 14.3 G/DL (ref 12–16)
IMM GRANULOCYTES # BLD AUTO: 0.14 X10(3)/MCL (ref 0–0.04)
IMM GRANULOCYTES NFR BLD AUTO: 1.4 %
LYMPHOCYTES # BLD AUTO: 2.31 X10(3)/MCL (ref 0.6–4.6)
LYMPHOCYTES NFR BLD AUTO: 23.5 %
MCH RBC QN AUTO: 33.3 PG (ref 27–31)
MCHC RBC AUTO-ENTMCNC: 34.3 G/DL (ref 33–36)
MCV RBC AUTO: 97.2 FL (ref 80–94)
MONOCYTES # BLD AUTO: 0.85 X10(3)/MCL (ref 0.1–1.3)
MONOCYTES NFR BLD AUTO: 8.6 %
NEUTROPHILS # BLD AUTO: 6.4 X10(3)/MCL (ref 2.1–9.2)
NEUTROPHILS NFR BLD AUTO: 65 %
NRBC BLD AUTO-RTO: 0 %
PLATELET # BLD AUTO: 305 X10(3)/MCL (ref 130–400)
PMV BLD AUTO: 10.6 FL (ref 7.4–10.4)
RBC # BLD AUTO: 4.29 X10(6)/MCL (ref 4.2–5.4)
WBC # SPEC AUTO: 9.85 X10(3)/MCL (ref 4.5–11.5)

## 2023-10-03 PROCEDURE — 25000003 PHARM REV CODE 250: Performed by: INTERNAL MEDICINE

## 2023-10-03 PROCEDURE — 99239 HOSP IP/OBS DSCHRG MGMT >30: CPT | Mod: ,,, | Performed by: INTERNAL MEDICINE

## 2023-10-03 PROCEDURE — 63600175 PHARM REV CODE 636 W HCPCS: Performed by: INTERNAL MEDICINE

## 2023-10-03 PROCEDURE — 99239 PR HOSPITAL DISCHARGE DAY,>30 MIN: ICD-10-PCS | Mod: ,,, | Performed by: INTERNAL MEDICINE

## 2023-10-03 PROCEDURE — 85025 COMPLETE CBC W/AUTO DIFF WBC: CPT | Performed by: INTERNAL MEDICINE

## 2023-10-03 RX ADMIN — Medication 1000 UNITS: at 09:10

## 2023-10-03 RX ADMIN — LISINOPRIL 10 MG: 10 TABLET ORAL at 09:10

## 2023-10-03 RX ADMIN — ENOXAPARIN SODIUM 70 MG: 80 INJECTION SUBCUTANEOUS at 09:10

## 2023-10-03 NOTE — PLAN OF CARE
10/03/23 1446   Final Note   Assessment Type Final Discharge Note   Anticipated Discharge Disposition Home-Health  (FOC: the patient will be discharged to home this afternoon with home health services through [facility] ([facility] Clinical notes/updates; AVS and D/C Summary were uploaded and sent via [employer] for review.)   Hospital Resources/Appts/Education Provided Appointments scheduled and added to AVS   Post-Acute Status   Post-Acute Authorization Home Health   Home Health Status Set-up Complete/Auth obtained   Coverage Payor:  MEDICARE - MEDICARE PART A & B   Patient choice form signed by patient/caregiver List with quality metrics by geographic area provided   Discharge Delays None known at this time     Patient will be discharged to her private residence with () services through [facility]. Clinical notes/updates and AVS & D/C Summary were uploaded and sent via [employer]. The patient and her daughter were informed of the above discharge plans and both are in agreement. She will be transported via family members personal vehicle.

## 2023-10-03 NOTE — DISCHARGE SUMMARY
[facility] Hospital Medicine  Discharge Summary      Patient Name: Neeru Watson  MRN: [id number]  [facility]: [id number]  Patient Class: IP- Inpatient  Admission Date: 10/1/2023  Hospital Length of Stay: 2 days  Discharge Date and Time:  10/03/2023 2:17 PM  Attending Physician: Melanie Manrique MD   Discharging Provider: Melanie Manrique MD  Primary Care Provider: Melanie Manrique MD    Primary Care Team: Networked reference to record PCT     HPI:   The patient is a 78-year-old woman who presented the emergency room with pain in the left posterior lateral chest of a pleuritic nature.  It had been somewhat chronic in nature but much worse yesterday.  Perhaps it was worsening shortness of breath although currently he denies any shortness of breath.  He has a history of pulmonary emboli following appendectomy less than a year ago.  She was treated with anticoagulation for a few months.  She wanted to get off the Eliquis.  A repeat CT scan showed no evidence of persistent PE and stopped.  Several months ago.  It was felt the original PE was provoked and related to the appendectomy.     She is not had any recent cough or wheezing.  No hemoptysis.  No fever chills.  No recent changes in medication.    Active Ambulatory Problems     Diagnosis Date Noted    Hypertension 05/20/2022    History of breast cancer 05/20/2022    Obesity 05/20/2022    Osteoporosis 05/20/2022    Colon polyps 05/20/2022    Vitamin D deficiency 05/20/2022    Pleuritic chest pain 01/19/2023    Bilateral pulmonary embolism 01/20/2023    Hyponatremia 01/20/2023    Plaque psoriasis 01/31/2023    Malignant neoplasm of left female breast, unspecified estrogen receptor status, unspecified site of breast 01/31/2023    Drug-induced immunodeficiency 01/31/2023    Paresthesia 05/22/2023    Abnormal head CT 05/22/2023     Resolved Ambulatory Problems     Diagnosis Date Noted    Acute appendicitis with localized  peritonitis, without perforation, abscess, or gangrene 12/10/2022    Encounter for Medicare annual wellness exam 01/31/2023     No Additional Past Medical History      Past Surgical History:   Procedure Laterality Date    APPENDECTOMY  12/10/2022    BREAST LUMPECTOMY      KNEE ARTHROSCOPY      LIPOMA RESECTION           Hospital Course:   Mrs. Watson was able to be weaned off the oxygen by hospital day 2.  She had some mild pain, but she was able to ambulate around the room without difficulty.  Her echo showed no sign of right heart strain.  And she remained hemodynamically stable.  She was treated with full dose lovenox initially.  She will be transitioned to eliquis at a loading dose for the next week, and then the treatment dose of 5 mg bid thereafter.  I did speak with Mrs. Watson about the importance of starting the Eliquis tonight.  There were no other issues during her stay.       Goals of Care Treatment Preferences:  Code Status: Full Code      Consults:     No new Assessment & Plan notes have been filed under this hospital service since the last note was generated.  Service: Hospital Medicine    Final Active Diagnoses:    Diagnosis Date Noted POA    PRINCIPAL PROBLEM:  Bilateral pulmonary embolism [I26.99] 01/20/2023 Yes    Hypertension [I10] 05/20/2022 Yes      Problems Resolved During this Admission:       Discharged Condition: good    Disposition: Home or Self Care    Follow Up:   Contact information for follow-up providers     Melanie Manrique MD Follow up in 1 week(s).    Specialty: Internal Medicine  Contact information:  [address] [facility] [address]  [phone]                   Contact information for after-discharge care     [facility] Medical [facility]     [facility]    Service: Home Health Services  Contact information:  [address] [facility] [address]  [phone]                           Patient Instructions:      Diet Adult Regular      SUBSEQUENT HOME HEALTH ORDERS   Order Comments: Arrange Home Health services; eval and treat for all disciplines   PCP:   Dr. Manrique     Order Specific Question Answer Comments   What Home Health Agency is the patient currently using? Other/External      Activity as tolerated       Significant Diagnostic Studies: Radiology: CT scan: Chest showing bilateral PE.    Pending Diagnostic Studies:     None         Medications:  Reconciled Home Medications:      Medication List      START taking these medications    * apixaban 5 mg Tab  Commonly known as: ELIQUIS  Take 2 tablets (10 mg total) by mouth 2 (two) times daily. for 7 days     * apixaban 5 mg Tab  Commonly known as: ELIQUIS  Take 1 tablet (5 mg total) by mouth 2 (two) times daily.         * This list has 2 medication(s) that are the same as other medications prescribed for you. Read the directions carefully, and ask your doctor or other care provider to review them with you.            CONTINUE taking these medications    lisinopriL 10 MG tablet  TAKE 1 TABLET BY MOUTH EVERY DAY     STELARA 45 mg/0.5 mL Syrg syringe  Generic drug: ustekinumab  every 3 (three) months.     vitamin D 1000 units Tab  Commonly known as: VITAMIN D3  Take 1,000 Units by mouth once daily.            Indwelling Lines/Drains at time of discharge:   Lines/Drains/Airways     None                 Time spent on the discharge of patient: 35 minutes         Melanie Manrique MD  Department of Hospital Medicine  [facility]

## 2023-10-03 NOTE — PLAN OF CARE
Problem: Adult Inpatient Plan of Care  Goal: Plan of Care Review  Outcome: Ongoing, Progressing  Goal: Patient-Specific Goal (Individualized)  Outcome: Ongoing, Progressing  Goal: Absence of Hospital-Acquired Illness or Injury  Outcome: Ongoing, Progressing  Goal: Optimal Comfort and Wellbeing  Outcome: Ongoing, Progressing  Goal: Readiness for Transition of Care  Outcome: Ongoing, Progressing      4

## 2023-10-03 NOTE — HOSPITAL COURSE
Mrs. Watson was able to be weaned off the oxygen by hospital day 2.  She had some mild pain, but she was able to ambulate around the room without difficulty.  Her echo showed no sign of right heart strain.  And she remained hemodynamically stable.  She was treated with full dose lovenox initially.  She will be transitioned to eliquis at a loading dose for the next week, and then the treatment dose of 5 mg bid thereafter.  I did speak with Mrs. Watson about the importance of starting the Eliquis tonight.  There were no other issues during her stay.

## 2023-10-03 NOTE — PLAN OF CARE
New referral sent for () services through [facility] as per patient and daughter's preference/request/FOC. () packet and orders and clinical updates/notes were uploaded and sent via [employer].

## 2023-10-04 PROCEDURE — G0180 MD CERTIFICATION HHA PATIENT: HCPCS | Mod: ,,, | Performed by: INTERNAL MEDICINE

## 2023-10-04 PROCEDURE — G0180 PR HOME HEALTH MD CERTIFICATION: ICD-10-PCS | Mod: ,,, | Performed by: INTERNAL MEDICINE

## 2023-10-05 ENCOUNTER — PATIENT OUTREACH (OUTPATIENT)
Dept: ADMINISTRATIVE | Facility: CLINIC | Age: 78
End: 2023-10-05
Payer: MEDICARE

## 2023-10-05 NOTE — PROGRESS NOTES
C3 nurse spoke with Neeru Watson  for a TCC post hospital discharge follow up call. The patient has a scheduled [facility] appointment with Melanie Manrique MD (Internal Medicine) on 10/12/2023 @ 4:00 pm.

## 2023-10-06 ENCOUNTER — TELEPHONE (OUTPATIENT)
Dept: INTERNAL MEDICINE | Facility: CLINIC | Age: 78
End: 2023-10-06
Payer: MEDICARE

## 2023-10-06 NOTE — TELEPHONE ENCOUNTER
----- Message from Belle Salazar LPN sent at 10/5/2023  2:00 PM CDT -----  Regarding: GODWIN Manrique 10/12/23 @4:00p  Are there any outstanding tasks in the chart? no    Is there any documentation of tasks? no    Has patient seen another physician, been to the ER, C, or admitted to hospital since last visit?    Has the patient done blood work or imaging since last visit?

## 2023-10-12 ENCOUNTER — OFFICE VISIT (OUTPATIENT)
Dept: INTERNAL MEDICINE | Facility: CLINIC | Age: 78
End: 2023-10-12
Payer: MEDICARE

## 2023-10-12 VITALS
OXYGEN SATURATION: 97 % | DIASTOLIC BLOOD PRESSURE: 82 MMHG | SYSTOLIC BLOOD PRESSURE: 136 MMHG | HEART RATE: 90 BPM | WEIGHT: 176 LBS | RESPIRATION RATE: 18 BRPM | HEIGHT: 62 IN | BODY MASS INDEX: 32.39 KG/M2

## 2023-10-12 DIAGNOSIS — I26.99 BILATERAL PULMONARY EMBOLISM: ICD-10-CM

## 2023-10-12 DIAGNOSIS — R53.83 FATIGUE, UNSPECIFIED TYPE: Primary | ICD-10-CM

## 2023-10-12 DIAGNOSIS — R91.8 PULMONARY NODULES: ICD-10-CM

## 2023-10-12 PROCEDURE — 99214 PR OFFICE/OUTPT VISIT, EST, LEVL IV, 30-39 MIN: ICD-10-PCS | Mod: ,,, | Performed by: INTERNAL MEDICINE

## 2023-10-12 PROCEDURE — 99214 OFFICE O/P EST MOD 30 MIN: CPT | Mod: ,,, | Performed by: INTERNAL MEDICINE

## 2023-10-12 NOTE — ASSESSMENT & PLAN NOTE
I will ask the radiologist to review the recent ct to look for the nodules seen on her ct from July and let her  know.

## 2023-10-12 NOTE — PROGRESS NOTES
Subjective:      Chief Complaint: Hospital Follow Up      HPI:She is here for f/u bilataral PE.  She c/o fatigue.  But she isn't having any breathing issues and the pain in her back has settled down.  She hasn't done much because of the fatigue.  She had some vertigo yesterday, but it concerned her given the recent events.  Its better today.  She has dark stool, but no blood or melena.  No swelling in her legs.  She is sleeping ok at night.      Transitional Care Note    Family and/or Caretaker present at visit?  No.  Diagnostic tests reviewed/disposition: No diagnosic tests pending after this hospitalization.  Disease/illness education:    Home health/community services discussion/referrals: Patient has home health established at [facility] .   Establishment or re-establishment of referral orders for community resources: No other necessary community resources.   Discussion with other health care providers: Will ask the radiologist to revieew her recent CT to see if the previously seen pulmonary nodules are still present.            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 Pabon and was on injections for the osteoporosis in the past.  She thinks the last bone density was in 2012.  She has declined a repeat bmd     Colon Polyps 5/20/2022   Vitamin D Deficiency 5/20/2022   Encounter for Medicare Annual Wellness Exam (Resolved) 1/31/2023   Acute Appendicitis With Localized  Peritonitis, Without Perforation, Abscess, Or Gangrene (Resolved) 12/10/2022    Formatting of this note might be different from the original.  Added automatically from request for surgery [id number]    Last Assessment & Plan:   Formatting of this note might be different from the original.  1.5 weeks s/p lap appy    - pathology reviewed- c/w diagnosis  - RUE ultrasound ordered for arm swelling  - will call with results  - RTC prn          The patient's Health Maintenance was reviewed and the following appears to be due:   Health Maintenance Due   Topic Date Due    COVID-19 Vaccine (1) Never done    Pneumococcal Vaccines (Age 65+) (1 - PCV) Never done    Shingles Vaccine (1 of 2) Never done    DEXA Scan  12/04/2018    TETANUS VACCINE  04/08/2023    Influenza Vaccine (1) Never done       Past Medical History:  Past Medical History:   Diagnosis Date    Acute appendicitis with localized peritonitis, without perforation, abscess, or gangrene 12/10/2022    Formatting of this note might be different from the original. Added automatically from request for surgery [id number]  Last Assessment & Plan:  Formatting of this note might be different from the original. 1.5 weeks s/p lap appy  - pathology reviewed- c/w diagnosis - RUE ultrasound ordered for arm swelling - will call with results - RTC prn    Hypertension      Past Surgical History:   Procedure Laterality Date    APPENDECTOMY  12/10/2022    BREAST LUMPECTOMY      KNEE ARTHROSCOPY      LIPOMA RESECTION       Review of patient's allergies indicates:   Allergen Reactions    Hydrochlorothiazide Rash    Ketoconazole Rash     Current Outpatient Medications on File Prior to Visit   Medication Sig Dispense Refill    lisinopriL 10 MG tablet TAKE 1 TABLET BY MOUTH EVERY DAY 90 tablet 3    vitamin D (VITAMIN D3) 1000 units Tab Take 1,000 Units by mouth once daily.      [DISCONTINUED] apixaban (ELIQUIS) 5 mg Tab Take 1 tablet (5 mg total) by mouth 2 (two) times daily. 60 tablet 6     "STELARA 45 mg/0.5 mL Syrg syringe every 3 (three) months.       No current facility-administered medications on file prior to visit.     Social History     Socioeconomic History    Marital status:    Tobacco Use    Smoking status: Never    Smokeless tobacco: Never   Substance and Sexual Activity    Alcohol use: Not Currently    Drug use: Never    Sexual activity: Not Currently     Social Determinants of Health     Food Insecurity: Unknown (5/22/2023)    Hunger Vital Sign     Worried About Running Out of Food in the Last Year: Never true   Transportation Needs: Unknown (5/22/2023)    PRAPARE - Transportation     Lack of Transportation (Medical): No   Social Connections: Socially Isolated (10/2/2023)    Social Connection and Isolation Panel [NHANES]     Frequency of Communication with Friends and Family: Twice a week     Frequency of Social Gatherings with Friends and Family: Twice a week     Attends Mandaeism Services: Never     Active Member of Clubs or Organizations: No     Attends Club or Organization Meetings: Never     Marital Status:    Housing Stability: Unknown (5/22/2023)    Housing Stability Vital Sign     Unable to Pay for Housing in the Last Year: No     Family History   Problem Relation Age of Onset    Liver cancer Mother     Heart disease Father     Hypertension Sister        Review of Systems    Objective:   /82 (BP Location: Right arm, Patient Position: Sitting, BP Method: Small (Manual))   Pulse 90   Resp 18   Ht 5' 2.01" (1.575 m)   Wt 79.8 kg (176 lb)   SpO2 97%   BMI 32.18 kg/m²     Physical Exam  Vitals reviewed.   Constitutional:       General: She is not in acute distress.     Appearance: Normal appearance. She is not ill-appearing or diaphoretic.   HENT:      Head: Normocephalic and atraumatic.   Cardiovascular:      Rate and Rhythm: Normal rate and regular rhythm.      Heart sounds: Normal heart sounds.   Pulmonary:      Effort: Pulmonary effort is normal.      " Breath sounds: Normal breath sounds.   Musculoskeletal:      Right lower leg: No edema.      Left lower leg: No edema.   Skin:     General: Skin is warm and dry.   Neurological:      General: No focal deficit present.      Mental Status: She is alert.   Psychiatric:         Mood and Affect: Mood normal.         Behavior: Behavior normal.         Thought Content: Thought content normal.         Judgment: Judgment normal.       Assessment and Plan:     Bilateral pulmonary embolism  Continue eliquis for life.      Fatigue  Recheck labs.    Pulmonary nodules  I will ask the radiologist to review the recent ct to look for the nodules seen on her ct from July and let her  know.     No follow-ups on file.    Medications Ordered This Encounter   Medications    apixaban (ELIQUIS) 5 mg Tab     Sig: Take 1 tablet (5 mg total) by mouth 2 (two) times daily.     Dispense:  180 tablet     Refill:  3     This will be to start in 1 week after she completes the loading dose.     [unfilled]  Orders Placed This Encounter   Procedures    CBC Auto Differential     Standing Status:   Future     Standing Expiration Date:   12/10/2024    Comprehensive Metabolic Panel     Standing Status:   Future     Standing Expiration Date:   12/10/2024    TSH     Standing Status:   Future     Standing Expiration Date:   12/10/2024

## 2023-10-12 NOTE — ASSESSMENT & PLAN NOTE
Recheck labs.   negative Alert & oriented; no sensory, motor or coordination deficits, normal reflexes

## 2023-10-13 ENCOUNTER — LAB VISIT (OUTPATIENT)
Dept: LAB | Facility: HOSPITAL | Age: 78
End: 2023-10-13
Attending: INTERNAL MEDICINE
Payer: MEDICARE

## 2023-10-13 DIAGNOSIS — R53.83 FATIGUE, UNSPECIFIED TYPE: ICD-10-CM

## 2023-10-13 LAB
ALBUMIN SERPL-MCNC: 3.4 G/DL (ref 3.4–4.8)
ALBUMIN/GLOB SERPL: 1.1 RATIO (ref 1.1–2)
ALP SERPL-CCNC: 105 UNIT/L (ref 40–150)
ALT SERPL-CCNC: 11 UNIT/L (ref 0–55)
AST SERPL-CCNC: 17 UNIT/L (ref 5–34)
BASOPHILS # BLD AUTO: 0.08 X10(3)/MCL
BASOPHILS NFR BLD AUTO: 0.9 %
BILIRUB SERPL-MCNC: 0.4 MG/DL
BUN SERPL-MCNC: 10.3 MG/DL (ref 9.8–20.1)
CALCIUM SERPL-MCNC: 9.5 MG/DL (ref 8.4–10.2)
CHLORIDE SERPL-SCNC: 107 MMOL/L (ref 98–107)
CO2 SERPL-SCNC: 26 MMOL/L (ref 23–31)
CREAT SERPL-MCNC: 0.96 MG/DL (ref 0.55–1.02)
EOSINOPHIL # BLD AUTO: 0.13 X10(3)/MCL (ref 0–0.9)
EOSINOPHIL NFR BLD AUTO: 1.5 %
ERYTHROCYTE [DISTWIDTH] IN BLOOD BY AUTOMATED COUNT: 13.4 % (ref 11.5–17)
GFR SERPLBLD CREATININE-BSD FMLA CKD-EPI: 60 MLS/MIN/1.73/M2
GLOBULIN SER-MCNC: 3.2 GM/DL (ref 2.4–3.5)
GLUCOSE SERPL-MCNC: 101 MG/DL (ref 82–115)
HCT VFR BLD AUTO: 41 % (ref 37–47)
HGB BLD-MCNC: 13.7 G/DL (ref 12–16)
IMM GRANULOCYTES # BLD AUTO: 0.08 X10(3)/MCL (ref 0–0.04)
IMM GRANULOCYTES NFR BLD AUTO: 0.9 %
LYMPHOCYTES # BLD AUTO: 2.46 X10(3)/MCL (ref 0.6–4.6)
LYMPHOCYTES NFR BLD AUTO: 28.2 %
MCH RBC QN AUTO: 33.4 PG (ref 27–31)
MCHC RBC AUTO-ENTMCNC: 33.4 G/DL (ref 33–36)
MCV RBC AUTO: 100 FL (ref 80–94)
MONOCYTES # BLD AUTO: 0.65 X10(3)/MCL (ref 0.1–1.3)
MONOCYTES NFR BLD AUTO: 7.4 %
NEUTROPHILS # BLD AUTO: 5.33 X10(3)/MCL (ref 2.1–9.2)
NEUTROPHILS NFR BLD AUTO: 61.1 %
NRBC BLD AUTO-RTO: 0 %
PLATELET # BLD AUTO: 342 X10(3)/MCL (ref 130–400)
PMV BLD AUTO: 10.2 FL (ref 7.4–10.4)
POTASSIUM SERPL-SCNC: 4.4 MMOL/L (ref 3.5–5.1)
PROT SERPL-MCNC: 6.6 GM/DL (ref 5.8–7.6)
RBC # BLD AUTO: 4.1 X10(6)/MCL (ref 4.2–5.4)
SODIUM SERPL-SCNC: 141 MMOL/L (ref 136–145)
TSH SERPL-ACNC: 1.22 UIU/ML (ref 0.35–4.94)
WBC # SPEC AUTO: 8.73 X10(3)/MCL (ref 4.5–11.5)

## 2023-10-13 PROCEDURE — 85025 COMPLETE CBC W/AUTO DIFF WBC: CPT

## 2023-10-13 PROCEDURE — 84443 ASSAY THYROID STIM HORMONE: CPT

## 2023-10-13 PROCEDURE — 36415 COLL VENOUS BLD VENIPUNCTURE: CPT

## 2023-10-13 PROCEDURE — 80053 COMPREHEN METABOLIC PANEL: CPT

## 2023-10-24 NOTE — PROGRESS NOTES
Let her know there are no concerns on her labs.  She might want to try some B12 for her fatigue.  She can buy it otc -- 1000 mcg daily.

## 2023-11-06 ENCOUNTER — EXTERNAL HOME HEALTH (OUTPATIENT)
Dept: HOME HEALTH SERVICES | Facility: HOSPITAL | Age: 78
End: 2023-11-06
Payer: MEDICARE

## 2023-11-27 ENCOUNTER — TELEPHONE (OUTPATIENT)
Dept: INTERNAL MEDICINE | Facility: CLINIC | Age: 78
End: 2023-11-27
Payer: MEDICARE

## 2023-11-27 DIAGNOSIS — Z12.31 VISIT FOR SCREENING MAMMOGRAM: Primary | ICD-10-CM

## 2023-11-27 NOTE — TELEPHONE ENCOUNTER
----- Message from Trini Boucher sent at 11/27/2023 11:11 AM CST -----  Regarding: mammo orders  Type:  Mammogram    Caller is requesting to schedule their annual mammogram appointment.  Order is not listed in EPIC.  Please enter order and contact patient to schedule.  Name of Caller:Neeru    Where would they like the mammogram performed?[facility] on [facility]    Would the patient rather a call back or a response via [facility]? Call back     Best Call Back Number:[phone]    Additional Information: Patient would like the mammo sent to [facility] on [facility]

## 2024-01-01 PROBLEM — I26.99 BILATERAL PULMONARY EMBOLISM: Status: RESOLVED | Noted: 2023-01-20 | Resolved: 2024-01-01

## 2024-01-24 ENCOUNTER — TELEPHONE (OUTPATIENT)
Dept: INTERNAL MEDICINE | Facility: CLINIC | Age: 79
End: 2024-01-24
Payer: MEDICARE

## 2024-01-24 NOTE — TELEPHONE ENCOUNTER
----- Message from Belle Salazar LPN sent at 1/23/2024  8:05 AM CST -----  Regarding: GODWIN Manrique 1/31/24 @10:00a  Are there any outstanding tasks in the chart? no    Is there any documentation of tasks? no    Has patient seen another physician, been to the ER, C, or admitted to hospital since last visit?    Has the patient done blood work or imaging since last visit?

## 2024-01-31 ENCOUNTER — OFFICE VISIT (OUTPATIENT)
Dept: INTERNAL MEDICINE | Facility: CLINIC | Age: 79
End: 2024-01-31
Payer: MEDICARE

## 2024-01-31 VITALS
OXYGEN SATURATION: 98 % | DIASTOLIC BLOOD PRESSURE: 64 MMHG | RESPIRATION RATE: 18 BRPM | HEART RATE: 92 BPM | WEIGHT: 185 LBS | SYSTOLIC BLOOD PRESSURE: 136 MMHG | HEIGHT: 62 IN | BODY MASS INDEX: 34.04 KG/M2

## 2024-01-31 DIAGNOSIS — Z86.711 HISTORY OF PULMONARY EMBOLISM: ICD-10-CM

## 2024-01-31 DIAGNOSIS — R91.8 PULMONARY NODULES: ICD-10-CM

## 2024-01-31 DIAGNOSIS — L98.9 SKIN LESION: ICD-10-CM

## 2024-01-31 DIAGNOSIS — I10 PRIMARY HYPERTENSION: Primary | ICD-10-CM

## 2024-01-31 DIAGNOSIS — D84.821 DRUG-INDUCED IMMUNODEFICIENCY: ICD-10-CM

## 2024-01-31 DIAGNOSIS — E55.9 VITAMIN D DEFICIENCY: ICD-10-CM

## 2024-01-31 DIAGNOSIS — Z79.899 DRUG-INDUCED IMMUNODEFICIENCY: ICD-10-CM

## 2024-01-31 PROBLEM — E87.1 HYPONATREMIA: Status: RESOLVED | Noted: 2023-01-20 | Resolved: 2024-01-31

## 2024-01-31 PROBLEM — R07.81 PLEURITIC CHEST PAIN: Status: RESOLVED | Noted: 2023-01-19 | Resolved: 2024-01-31

## 2024-01-31 PROBLEM — C50.912 MALIGNANT NEOPLASM OF LEFT FEMALE BREAST, UNSPECIFIED ESTROGEN RECEPTOR STATUS, UNSPECIFIED SITE OF BREAST: Status: RESOLVED | Noted: 2023-01-31 | Resolved: 2024-01-31

## 2024-01-31 PROCEDURE — 99214 OFFICE O/P EST MOD 30 MIN: CPT | Mod: ,,, | Performed by: INTERNAL MEDICINE

## 2024-01-31 NOTE — PROGRESS NOTES
Subjective:      Chief Complaint: Follow-up (6mo)      HPI:She is here for f/u htn, h/o PE.  She has no complaints.  She isn't checking her BP at home.  She isn't exercising.  Her knee bothers her too much.    Her psoriasis has resolved.  She hasn't been on stelara in 6-8 mo.  Problem Noted   History of Pulmonary Embolism 1/31/2024    She will need to remain on lifelong anticoagulation.     Skin Lesion 1/31/2024   Fatigue 10/12/2023   Pulmonary Nodules 10/12/2023   Paresthesia 5/22/2023   Abnormal Head CT 5/22/2023   Plaque Psoriasis 1/31/2023   Hypertension 5/20/2022   History of Breast Cancer 5/20/2022   Obesity 5/20/2022   Osteoporosis 5/20/2022    had bone density 12/2015 that showed osteoporosis but patient opted not to treathad seen Dr. Pabon and was on injections for the osteoporosis in the past.  She thinks the last bone density was in 2012.  She has declined a repeat bmd     Colon Polyps 5/20/2022   Vitamin D Deficiency 5/20/2022   Encounter for Medicare Annual Wellness Exam (Resolved) 1/31/2023   Malignant Neoplasm of Left Female Breast, Unspecified Estrogen Receptor Status, Unspecified Site of Breast (Resolved) 1/31/2023   Bilateral Pulmonary Embolism (Resolved) 1/20/2023   Hyponatremia (Resolved) 1/20/2023   Pleuritic Chest Pain (Resolved) 1/19/2023   Acute Appendicitis With Localized Peritonitis, Without Perforation, Abscess, Or Gangrene (Resolved) 12/10/2022    Formatting of this note might be different from the original.  Added automatically from request for surgery [id number]    Last Assessment & Plan:   Formatting of this note might be different from the original.  1.5 weeks s/p lap appy    - pathology reviewed- c/w diagnosis  - RUE ultrasound ordered for arm swelling  - will call with results  - RTC prn          The patient's Health Maintenance was reviewed and the following appears to be due:   Health Maintenance Due   Topic Date Due    COVID-19 Vaccine (1) Never done    Pneumococcal Vaccines (Age  "65+) (1 of 2 - PCV) Never done    Shingles Vaccine (1 of 2) Never done    RSV Vaccine (Age 60+ and Pregnant patients) (1 - 1-dose 60+ series) Never done    DEXA Scan  12/04/2018    TETANUS VACCINE  04/08/2023    Influenza Vaccine (1) Never done       Past Medical History:  Past Medical History:   Diagnosis Date    Acute appendicitis with localized peritonitis, without perforation, abscess, or gangrene 12/10/2022    Formatting of this note might be different from the original. Added automatically from request for surgery [id number]  Last Assessment & Plan:  Formatting of this note might be different from the original. 1.5 weeks s/p lap appy  - pathology reviewed- c/w diagnosis - RUE ultrasound ordered for arm swelling - will call with results - RTC prn    Hypertension     Hyponatremia 01/20/2023    Pleuritic chest pain 01/19/2023     Review of patient's allergies indicates:   Allergen Reactions    Hydrochlorothiazide Rash    Ketoconazole Rash     Current Outpatient Medications on File Prior to Visit   Medication Sig Dispense Refill    apixaban (ELIQUIS) 5 mg Tab Take 1 tablet (5 mg total) by mouth 2 (two) times daily. 180 tablet 3    lisinopriL 10 MG tablet TAKE 1 TABLET BY MOUTH EVERY DAY 90 tablet 3    vitamin D (VITAMIN D3) 1000 units Tab Take 1,000 Units by mouth once daily.      STELARA 45 mg/0.5 mL Syrg syringe every 3 (three) months.       No current facility-administered medications on file prior to visit.       Review of Systems    Objective:   /64 (BP Location: Right arm, Patient Position: Sitting, BP Method: Large (Manual))   Pulse 92   Resp 18   Ht 5' 2.01" (1.575 m)   Wt 83.9 kg (185 lb)   SpO2 98%   BMI 33.83 kg/m²     Physical Exam  Constitutional:       General: She is not in acute distress.     Appearance: Normal appearance.   HENT:      Head: Normocephalic and atraumatic.   Eyes:      General: No scleral icterus.     Conjunctiva/sclera: Conjunctivae normal.   Neck:      Vascular: No carotid " bruit.   Cardiovascular:      Rate and Rhythm: Normal rate and regular rhythm.      Pulses: Normal pulses.      Heart sounds: Normal heart sounds. No murmur heard.     No friction rub. No gallop.   Pulmonary:      Effort: Pulmonary effort is normal.      Breath sounds: Normal breath sounds.   Abdominal:      General: Bowel sounds are normal.      Palpations: Abdomen is soft. There is no mass.      Tenderness: There is no abdominal tenderness. There is no guarding or rebound.   Musculoskeletal:         General: No deformity.      Cervical back: No rigidity or tenderness.      Right lower leg: No edema.      Left lower leg: No edema.   Lymphadenopathy:      Cervical: No cervical adenopathy.   Skin:     General: Skin is warm and dry.      Coloration: Skin is not jaundiced or pale.      Findings: Lesion (left nose skin lesion with eschar approx 3-5 mm) present. No erythema.   Neurological:      General: No focal deficit present.      Mental Status: She is alert and oriented to person, place, and time.      Gait: Gait normal.   Psychiatric:         Mood and Affect: Mood normal.         Behavior: Behavior normal.         Thought Content: Thought content normal.         Judgment: Judgment normal.       Assessment and Plan:     Pulmonary nodules  The nodule was stable in size in October.  Will plan to f/u with one more CT in the fall to verify its stable.  She has not tobacco hx, but she does have a h/o breast cancer.    Skin lesion  I rec she see her dermatologist for a repeat evaluation of the lesion on her face that keeps scabbing over.    Hypertension  Stable.  Continue lisionpril      Follow up in about 6 months (around 7/31/2024).       [unfilled]  Orders Placed This Encounter   Procedures    CBC Auto Differential     Standing Status:   Future     Standing Expiration Date:   3/31/2025    Comprehensive Metabolic Panel     Standing Status:   Future     Standing Expiration Date:   3/31/2025    Lipid Panel     Standing  Status:   Future     Standing Expiration Date:   7/31/2025    Vitamin D     Standing Status:   Future     Standing Expiration Date:   3/31/2025

## 2024-01-31 NOTE — ASSESSMENT & PLAN NOTE
The nodule was stable in size in October.  Will plan to f/u with one more CT in the fall to verify its stable.  She has not tobacco hx, but she does have a h/o breast cancer.

## 2024-01-31 NOTE — ASSESSMENT & PLAN NOTE
I rec she see her dermatologist for a repeat evaluation of the lesion on her face that keeps scabbing over.

## 2024-02-02 DIAGNOSIS — I10 PRIMARY HYPERTENSION: ICD-10-CM

## 2024-02-02 RX ORDER — LISINOPRIL 10 MG/1
TABLET ORAL
Qty: 90 TABLET | Refills: 3 | Status: SHIPPED | OUTPATIENT
Start: 2024-02-02

## 2024-02-08 LAB — BCS RECOMMENDATION EXT: NORMAL

## 2024-02-15 ENCOUNTER — DOCUMENTATION ONLY (OUTPATIENT)
Dept: INTERNAL MEDICINE | Facility: CLINIC | Age: 79
End: 2024-02-15
Payer: MEDICARE

## 2024-02-22 ENCOUNTER — OFFICE VISIT (OUTPATIENT)
Dept: INTERNAL MEDICINE | Facility: CLINIC | Age: 79
End: 2024-02-22
Payer: MEDICARE

## 2024-02-22 ENCOUNTER — LAB VISIT (OUTPATIENT)
Dept: LAB | Facility: HOSPITAL | Age: 79
End: 2024-02-22
Attending: INTERNAL MEDICINE
Payer: MEDICARE

## 2024-02-22 VITALS
HEIGHT: 62 IN | HEART RATE: 102 BPM | BODY MASS INDEX: 33.86 KG/M2 | DIASTOLIC BLOOD PRESSURE: 62 MMHG | WEIGHT: 184 LBS | OXYGEN SATURATION: 97 % | RESPIRATION RATE: 18 BRPM | SYSTOLIC BLOOD PRESSURE: 134 MMHG

## 2024-02-22 DIAGNOSIS — R10.32 LEFT LOWER QUADRANT PAIN: ICD-10-CM

## 2024-02-22 DIAGNOSIS — R10.32 LEFT LOWER QUADRANT PAIN: Primary | ICD-10-CM

## 2024-02-22 DIAGNOSIS — R10.32 LLQ PAIN: ICD-10-CM

## 2024-02-22 LAB
ALBUMIN SERPL-MCNC: 3.9 G/DL (ref 3.4–4.8)
ALBUMIN/GLOB SERPL: 1.2 RATIO (ref 1.1–2)
ALP SERPL-CCNC: 105 UNIT/L (ref 40–150)
ALT SERPL-CCNC: 15 UNIT/L (ref 0–55)
APPEARANCE UR: CLEAR
AST SERPL-CCNC: 22 UNIT/L (ref 5–34)
BACTERIA #/AREA URNS AUTO: ABNORMAL /HPF
BASOPHILS # BLD AUTO: 0.06 X10(3)/MCL
BASOPHILS NFR BLD AUTO: 0.5 %
BILIRUB SERPL-MCNC: 0.5 MG/DL
BILIRUB UR QL STRIP.AUTO: NEGATIVE
BUN SERPL-MCNC: 19.6 MG/DL (ref 9.8–20.1)
CALCIUM SERPL-MCNC: 9.8 MG/DL (ref 8.4–10.2)
CHLORIDE SERPL-SCNC: 106 MMOL/L (ref 98–107)
CO2 SERPL-SCNC: 23 MMOL/L (ref 23–31)
COLOR UR AUTO: COLORLESS
CREAT SERPL-MCNC: 1.3 MG/DL (ref 0.55–1.02)
EOSINOPHIL # BLD AUTO: 0.02 X10(3)/MCL (ref 0–0.9)
EOSINOPHIL NFR BLD AUTO: 0.2 %
ERYTHROCYTE [DISTWIDTH] IN BLOOD BY AUTOMATED COUNT: 13.7 % (ref 11.5–17)
GFR SERPLBLD CREATININE-BSD FMLA CKD-EPI: 42 MLS/MIN/1.73/M2
GLOBULIN SER-MCNC: 3.3 GM/DL (ref 2.4–3.5)
GLUCOSE SERPL-MCNC: 98 MG/DL (ref 82–115)
GLUCOSE UR QL STRIP.AUTO: NORMAL
HCT VFR BLD AUTO: 43 % (ref 37–47)
HGB BLD-MCNC: 14.1 G/DL (ref 12–16)
IMM GRANULOCYTES # BLD AUTO: 0.08 X10(3)/MCL (ref 0–0.04)
IMM GRANULOCYTES NFR BLD AUTO: 0.6 %
KETONES UR QL STRIP.AUTO: NEGATIVE
LEUKOCYTE ESTERASE UR QL STRIP.AUTO: 250
LYMPHOCYTES # BLD AUTO: 2.23 X10(3)/MCL (ref 0.6–4.6)
LYMPHOCYTES NFR BLD AUTO: 17.5 %
MCH RBC QN AUTO: 32.6 PG (ref 27–31)
MCHC RBC AUTO-ENTMCNC: 32.8 G/DL (ref 33–36)
MCV RBC AUTO: 99.3 FL (ref 80–94)
MONOCYTES # BLD AUTO: 0.8 X10(3)/MCL (ref 0.1–1.3)
MONOCYTES NFR BLD AUTO: 6.3 %
MUCOUS THREADS URNS QL MICRO: ABNORMAL /LPF
NEUTROPHILS # BLD AUTO: 9.55 X10(3)/MCL (ref 2.1–9.2)
NEUTROPHILS NFR BLD AUTO: 74.9 %
NITRITE UR QL STRIP.AUTO: NEGATIVE
NRBC BLD AUTO-RTO: 0 %
PH UR STRIP.AUTO: 5.5 [PH]
PLATELET # BLD AUTO: 298 X10(3)/MCL (ref 130–400)
PMV BLD AUTO: 11.1 FL (ref 7.4–10.4)
POTASSIUM SERPL-SCNC: 4.3 MMOL/L (ref 3.5–5.1)
PROT SERPL-MCNC: 7.2 GM/DL (ref 5.8–7.6)
PROT UR QL STRIP.AUTO: NEGATIVE
RBC # BLD AUTO: 4.33 X10(6)/MCL (ref 4.2–5.4)
RBC #/AREA URNS AUTO: ABNORMAL /HPF
RBC UR QL AUTO: NEGATIVE
SODIUM SERPL-SCNC: 138 MMOL/L (ref 136–145)
SP GR UR STRIP.AUTO: 1.01 (ref 1–1.03)
SQUAMOUS #/AREA URNS LPF: ABNORMAL /HPF
UROBILINOGEN UR STRIP-ACNC: NORMAL
WBC # SPEC AUTO: 12.74 X10(3)/MCL (ref 4.5–11.5)
WBC #/AREA URNS AUTO: ABNORMAL /HPF

## 2024-02-22 PROCEDURE — 85025 COMPLETE CBC W/AUTO DIFF WBC: CPT

## 2024-02-22 PROCEDURE — 99214 OFFICE O/P EST MOD 30 MIN: CPT | Mod: ,,, | Performed by: INTERNAL MEDICINE

## 2024-02-22 PROCEDURE — 81001 URINALYSIS AUTO W/SCOPE: CPT

## 2024-02-22 PROCEDURE — 36415 COLL VENOUS BLD VENIPUNCTURE: CPT

## 2024-02-22 PROCEDURE — 87077 CULTURE AEROBIC IDENTIFY: CPT

## 2024-02-22 PROCEDURE — 80053 COMPREHEN METABOLIC PANEL: CPT

## 2024-02-22 NOTE — PROGRESS NOTES
Subjective:      Chief Complaint: Groin Pain (C/o L groin pain since the weekend. She feels like it is pinching something. )      HPI:She is c/o a pinching pain in her left groin.  It did it 4-5 times in a couple of days.  It feels like a pressure.  The pressure isn't very bothersome, but occ it feels a little tighter.  It doesn't seem to be positional.  The pinching a few days ago happened while she was sitting.  Her bowels are moving fine daily. No dysuria.  She has chronic frequency.  No walking issues outside of her bad knee.  The soreness has been present for a while -- at least several weeks if not mos.  But the pinching sensation just started over the weekend but has since resolved.  Problem Noted   Llq Pain 2/22/2024   History of Pulmonary Embolism 1/31/2024    She will need to remain on lifelong anticoagulation.     Skin Lesion 1/31/2024   Fatigue 10/12/2023   Pulmonary Nodules 10/12/2023   Paresthesia 5/22/2023   Abnormal Head CT 5/22/2023   Plaque Psoriasis 1/31/2023   Hypertension 5/20/2022   History of Breast Cancer 5/20/2022   Obesity 5/20/2022   Osteoporosis 5/20/2022    had bone density 12/2015 that showed osteoporosis but patient opted not to treathad seen Dr. Pabon and was on injections for the osteoporosis in the past.  She thinks the last bone density was in 2012.  She has declined a repeat bmd     Colon Polyps 5/20/2022   Vitamin D Deficiency 5/20/2022   Encounter for Medicare Annual Wellness Exam (Resolved) 1/31/2023   Malignant Neoplasm of Left Female Breast, Unspecified Estrogen Receptor Status, Unspecified Site of Breast (Resolved) 1/31/2023   Bilateral Pulmonary Embolism (Resolved) 1/20/2023   Hyponatremia (Resolved) 1/20/2023   Pleuritic Chest Pain (Resolved) 1/19/2023   Acute Appendicitis With Localized Peritonitis, Without Perforation, Abscess, Or Gangrene (Resolved) 12/10/2022    Formatting of this note might be different from the original.  Added automatically from request for surgery  [id number]    Last Assessment & Plan:   Formatting of this note might be different from the original.  1.5 weeks s/p lap appy    - pathology reviewed- c/w diagnosis  - RUE ultrasound ordered for arm swelling  - will call with results  - RTC prn          The patient's Health Maintenance was reviewed and the following appears to be due:   Health Maintenance Due   Topic Date Due    COVID-19 Vaccine (1) Never done    Pneumococcal Vaccines (Age 65+) (1 of 2 - PCV) Never done    Shingles Vaccine (1 of 2) Never done    RSV Vaccine (Age 60+ and Pregnant patients) (1 - 1-dose 60+ series) Never done    DEXA Scan  12/04/2018    TETANUS VACCINE  04/08/2023    Influenza Vaccine (1) Never done       Past Medical History:  Past Medical History:   Diagnosis Date    Acute appendicitis with localized peritonitis, without perforation, abscess, or gangrene 12/10/2022    Formatting of this note might be different from the original. Added automatically from request for surgery [id number]  Last Assessment & Plan:  Formatting of this note might be different from the original. 1.5 weeks s/p lap appy  - pathology reviewed- c/w diagnosis - RUE ultrasound ordered for arm swelling - will call with results - RTC prn    Hypertension     Hyponatremia 01/20/2023    Pleuritic chest pain 01/19/2023     Review of patient's allergies indicates:   Allergen Reactions    Hydrochlorothiazide Rash    Ketoconazole Rash     Current Outpatient Medications on File Prior to Visit   Medication Sig Dispense Refill    apixaban (ELIQUIS) 5 mg Tab Take 1 tablet (5 mg total) by mouth 2 (two) times daily. 180 tablet 3    lisinopriL 10 MG tablet TAKE 1 TABLET BY MOUTH EVERY DAY 90 tablet 3    vitamin D (VITAMIN D3) 1000 units Tab Take 1,000 Units by mouth once daily.      STELARA 45 mg/0.5 mL Syrg syringe every 3 (three) months.       No current facility-administered medications on file prior to visit.       Review of Systems   Skin:  Rash: Her rash  is  "better.       Objective:   /62 (BP Location: Right arm, Patient Position: Sitting, BP Method: Large (Manual))   Pulse 102   Resp 18   Ht 5' 2.01" (1.575 m)   Wt 83.5 kg (184 lb)   SpO2 97%   BMI 33.65 kg/m²     Physical Exam  Vitals reviewed.   Constitutional:       General: She is not in acute distress.     Appearance: Normal appearance. She is not ill-appearing or diaphoretic.   HENT:      Head: Normocephalic and atraumatic.   Pulmonary:      Effort: Pulmonary effort is normal.   Abdominal:      General: Abdomen is flat.      Palpations: Abdomen is soft. There is no mass.      Tenderness: There is abdominal tenderness (LLQ). There is no guarding or rebound.      Hernia: No hernia is present.   Skin:     General: Skin is warm and dry.   Neurological:      General: No focal deficit present.      Mental Status: She is alert.   Psychiatric:         Mood and Affect: Mood normal.         Behavior: Behavior normal.         Thought Content: Thought content normal.         Judgment: Judgment normal.     Assessment and Plan:     LLQ pain  Will get labs and imaging to r/o a structural problem vs. Infection.      No follow-ups on file.       [unfilled]  Orders Placed This Encounter   Procedures    CT Abdomen Pelvis With IV Contrast Routine Oral Contrast     Standing Status:   Future     Standing Expiration Date:   2/22/2025     Order Specific Question:   Does this patient have impaired renal function?     Answer:   No     Order Specific Question:   May the Radiologist modify the order per protocol to meet the clinical needs of the patient?     Answer:   Yes     Order Specific Question:   Oral/Rectal Contrast instructions:     Answer:   Routine Oral Contrast    CBC Auto Differential     Standing Status:   Future     Standing Expiration Date:   4/22/2025    Comprehensive Metabolic Panel     Standing Status:   Future     Standing Expiration Date:   4/22/2025    Urinalysis, Reflex to Urine Culture     Standing " Status:   Future     Standing Expiration Date:   3/22/2024     Order Specific Question:   Specimen Source     Answer:   Urine

## 2024-02-23 RX ORDER — SULFAMETHOXAZOLE AND TRIMETHOPRIM 800; 160 MG/1; MG/1
1 TABLET ORAL 2 TIMES DAILY
Qty: 10 TABLET | Refills: 0 | Status: SHIPPED | OUTPATIENT
Start: 2024-02-23 | End: 2024-02-28

## 2024-02-24 LAB — BACTERIA UR CULT: ABNORMAL

## 2024-07-29 DIAGNOSIS — I26.99 ACUTE PULMONARY EMBOLISM WITHOUT ACUTE COR PULMONALE, UNSPECIFIED PULMONARY EMBOLISM TYPE: Primary | ICD-10-CM

## 2024-07-29 RX ORDER — APIXABAN 5 MG/1
TABLET, FILM COATED ORAL
Qty: 180 TABLET | Refills: 3 | Status: SHIPPED | OUTPATIENT
Start: 2024-07-29

## 2024-07-31 ENCOUNTER — TELEPHONE (OUTPATIENT)
Dept: INTERNAL MEDICINE | Facility: CLINIC | Age: 79
End: 2024-07-31
Payer: MEDICARE

## 2024-07-31 NOTE — TELEPHONE ENCOUNTER
----- Message from Belle Salazar LPN sent at 7/31/2024  8:56 AM CDT -----  Regarding: GODWIN Manrique 8/7/24 @0940  Are there any outstanding tasks in the chart? Pt will need fasting labs     Is there any documentation of tasks? no    Please tell patient to bring living will, power of , or advance directive document to visit if they have it.

## 2024-08-05 ENCOUNTER — LAB VISIT (OUTPATIENT)
Dept: LAB | Facility: HOSPITAL | Age: 79
End: 2024-08-05
Attending: INTERNAL MEDICINE
Payer: MEDICARE

## 2024-08-05 DIAGNOSIS — E55.9 VITAMIN D DEFICIENCY: ICD-10-CM

## 2024-08-05 DIAGNOSIS — I10 PRIMARY HYPERTENSION: ICD-10-CM

## 2024-08-05 LAB
25(OH)D3+25(OH)D2 SERPL-MCNC: 58 NG/ML (ref 30–80)
ALBUMIN SERPL-MCNC: 3.7 G/DL (ref 3.4–4.8)
ALBUMIN/GLOB SERPL: 1.2 RATIO (ref 1.1–2)
ALP SERPL-CCNC: 96 UNIT/L (ref 40–150)
ALT SERPL-CCNC: 11 UNIT/L (ref 0–55)
ANION GAP SERPL CALC-SCNC: 9 MEQ/L
AST SERPL-CCNC: 20 UNIT/L (ref 5–34)
BASOPHILS # BLD AUTO: 0.04 X10(3)/MCL
BASOPHILS NFR BLD AUTO: 0.5 %
BILIRUB SERPL-MCNC: 0.6 MG/DL
BUN SERPL-MCNC: 10.6 MG/DL (ref 9.8–20.1)
CALCIUM SERPL-MCNC: 9.5 MG/DL (ref 8.4–10.2)
CHLORIDE SERPL-SCNC: 110 MMOL/L (ref 98–107)
CHOLEST SERPL-MCNC: 221 MG/DL
CHOLEST/HDLC SERPL: 4 {RATIO} (ref 0–5)
CO2 SERPL-SCNC: 24 MMOL/L (ref 23–31)
CREAT SERPL-MCNC: 1.1 MG/DL (ref 0.55–1.02)
CREAT/UREA NIT SERPL: 10
EOSINOPHIL # BLD AUTO: 0.13 X10(3)/MCL (ref 0–0.9)
EOSINOPHIL NFR BLD AUTO: 1.6 %
ERYTHROCYTE [DISTWIDTH] IN BLOOD BY AUTOMATED COUNT: 13.2 % (ref 11.5–17)
GFR SERPLBLD CREATININE-BSD FMLA CKD-EPI: 51 ML/MIN/1.73/M2
GLOBULIN SER-MCNC: 3.2 GM/DL (ref 2.4–3.5)
GLUCOSE SERPL-MCNC: 115 MG/DL (ref 82–115)
HCT VFR BLD AUTO: 43.6 % (ref 37–47)
HDLC SERPL-MCNC: 57 MG/DL (ref 35–60)
HGB BLD-MCNC: 14.5 G/DL (ref 12–16)
IMM GRANULOCYTES # BLD AUTO: 0.03 X10(3)/MCL (ref 0–0.04)
IMM GRANULOCYTES NFR BLD AUTO: 0.4 %
LDLC SERPL CALC-MCNC: 126 MG/DL (ref 50–140)
LYMPHOCYTES # BLD AUTO: 1.93 X10(3)/MCL (ref 0.6–4.6)
LYMPHOCYTES NFR BLD AUTO: 24.3 %
MCH RBC QN AUTO: 32.7 PG (ref 27–31)
MCHC RBC AUTO-ENTMCNC: 33.3 G/DL (ref 33–36)
MCV RBC AUTO: 98.4 FL (ref 80–94)
MONOCYTES # BLD AUTO: 0.44 X10(3)/MCL (ref 0.1–1.3)
MONOCYTES NFR BLD AUTO: 5.5 %
NEUTROPHILS # BLD AUTO: 5.37 X10(3)/MCL (ref 2.1–9.2)
NEUTROPHILS NFR BLD AUTO: 67.7 %
NRBC BLD AUTO-RTO: 0 %
PLATELET # BLD AUTO: 283 X10(3)/MCL (ref 130–400)
PLATELETS.RETICULATED NFR BLD AUTO: 5.1 % (ref 0.9–11.2)
PMV BLD AUTO: 10.8 FL (ref 7.4–10.4)
POTASSIUM SERPL-SCNC: 4.4 MMOL/L (ref 3.5–5.1)
PROT SERPL-MCNC: 6.9 GM/DL (ref 5.8–7.6)
RBC # BLD AUTO: 4.43 X10(6)/MCL (ref 4.2–5.4)
SODIUM SERPL-SCNC: 143 MMOL/L (ref 136–145)
TRIGL SERPL-MCNC: 192 MG/DL (ref 37–140)
VLDLC SERPL CALC-MCNC: 38 MG/DL
WBC # BLD AUTO: 7.94 X10(3)/MCL (ref 4.5–11.5)

## 2024-08-05 PROCEDURE — 80053 COMPREHEN METABOLIC PANEL: CPT

## 2024-08-05 PROCEDURE — 36415 COLL VENOUS BLD VENIPUNCTURE: CPT

## 2024-08-05 PROCEDURE — 85025 COMPLETE CBC W/AUTO DIFF WBC: CPT

## 2024-08-05 PROCEDURE — 82306 VITAMIN D 25 HYDROXY: CPT

## 2024-08-05 PROCEDURE — 80061 LIPID PANEL: CPT

## 2024-08-07 ENCOUNTER — OFFICE VISIT (OUTPATIENT)
Dept: INTERNAL MEDICINE | Facility: CLINIC | Age: 79
End: 2024-08-07
Payer: MEDICARE

## 2024-08-07 VITALS
OXYGEN SATURATION: 100 % | WEIGHT: 185 LBS | RESPIRATION RATE: 18 BRPM | DIASTOLIC BLOOD PRESSURE: 64 MMHG | BODY MASS INDEX: 34.04 KG/M2 | HEART RATE: 87 BPM | SYSTOLIC BLOOD PRESSURE: 130 MMHG | HEIGHT: 62 IN

## 2024-08-07 DIAGNOSIS — R91.8 PULMONARY NODULES: ICD-10-CM

## 2024-08-07 DIAGNOSIS — E55.9 VITAMIN D DEFICIENCY: ICD-10-CM

## 2024-08-07 DIAGNOSIS — I10 PRIMARY HYPERTENSION: Primary | ICD-10-CM

## 2024-08-07 PROCEDURE — 99214 OFFICE O/P EST MOD 30 MIN: CPT | Mod: ,,, | Performed by: INTERNAL MEDICINE

## 2024-10-08 ENCOUNTER — OFFICE VISIT (OUTPATIENT)
Dept: URGENT CARE | Facility: CLINIC | Age: 79
End: 2024-10-08
Payer: MEDICARE

## 2024-10-08 VITALS
HEART RATE: 85 BPM | BODY MASS INDEX: 34.04 KG/M2 | RESPIRATION RATE: 18 BRPM | WEIGHT: 185 LBS | TEMPERATURE: 98 F | OXYGEN SATURATION: 98 % | DIASTOLIC BLOOD PRESSURE: 79 MMHG | SYSTOLIC BLOOD PRESSURE: 130 MMHG | HEIGHT: 62 IN

## 2024-10-08 DIAGNOSIS — R30.0 DYSURIA: ICD-10-CM

## 2024-10-08 DIAGNOSIS — N30.91 CYSTITIS WITH HEMATURIA: Primary | ICD-10-CM

## 2024-10-08 LAB
BILIRUB UR QL STRIP: NEGATIVE
GLUCOSE UR QL STRIP: NEGATIVE
KETONES UR QL STRIP: NEGATIVE
LEUKOCYTE ESTERASE UR QL STRIP: POSITIVE
PH, POC UA: 6
POC BLOOD, URINE: POSITIVE
POC NITRATES, URINE: NEGATIVE
PROT UR QL STRIP: POSITIVE
SP GR UR STRIP: 1.01 (ref 1–1.03)
UROBILINOGEN UR STRIP-ACNC: 0.1 (ref 0.1–1.1)

## 2024-10-08 PROCEDURE — 87086 URINE CULTURE/COLONY COUNT: CPT

## 2024-10-08 PROCEDURE — 99213 OFFICE O/P EST LOW 20 MIN: CPT | Mod: ,,,

## 2024-10-08 PROCEDURE — 81003 URINALYSIS AUTO W/O SCOPE: CPT | Mod: QW,,,

## 2024-10-08 PROCEDURE — 87186 SC STD MICRODIL/AGAR DIL: CPT

## 2024-10-08 RX ORDER — PHENAZOPYRIDINE HYDROCHLORIDE 200 MG/1
200 TABLET, FILM COATED ORAL 3 TIMES DAILY PRN
Qty: 6 TABLET | Refills: 0 | Status: SHIPPED | OUTPATIENT
Start: 2024-10-08 | End: 2024-10-10

## 2024-10-08 RX ORDER — NITROFURANTOIN 25; 75 MG/1; MG/1
100 CAPSULE ORAL 2 TIMES DAILY
Qty: 14 CAPSULE | Refills: 0 | Status: SHIPPED | OUTPATIENT
Start: 2024-10-08 | End: 2024-10-15

## 2024-10-08 NOTE — PROGRESS NOTES
"Subjective:      Patient ID: Neeru Watson is a 79 y.o. female.    Vitals:  height is 5' 2" (1.575 m) and weight is 83.9 kg (185 lb). Her oral temperature is 98.1 °F (36.7 °C). Her blood pressure is 130/79 and her pulse is 85. Her respiration is 18 and oxygen saturation is 98%.     Chief Complaint: Urinary Tract Infection     Patient is a 79 y.o. female who presents to urgent care with complaints of burning with urination. X2 days.  She drinks coffee every morning.  Denies any vaginal irritation or discharge.  States she goes to bathroom frequently all the time.     Urinary Tract Infection   This is a new problem. The current episode started acute onset. The problem has been unchanged. The quality of the pain is described as burning. The pain is mild. There has been no fever. Associated symptoms include frequency. Pertinent negatives include no discharge, flank pain or nausea. She has tried increased fluids for the symptoms. The treatment provided no relief.     Constitution: Negative.   Cardiovascular: Negative.    Respiratory: Negative.     Gastrointestinal: Negative.  Negative for abdominal pain and nausea.   Genitourinary:  Positive for dysuria and frequency. Negative for flank pain and vaginal discharge.   Neurological: Negative.    Psychiatric/Behavioral: Negative.        Objective:     Physical Exam   Constitutional: She is oriented to person, place, and time.  Non-toxic appearance. No distress. normal  HENT:   Head: Normocephalic and atraumatic.   Nose: Nose normal.   Eyes: Conjunctivae are normal.   Neck: Neck supple.   Cardiovascular: Normal rate, normal heart sounds and normal pulses. An irregular rhythm present.   Pulmonary/Chest: Effort normal and breath sounds normal. No respiratory distress.   Abdominal: Normal appearance. Soft. There is abdominal tenderness in the suprapubic area. There is no rebound, no guarding, no left CVA tenderness and no right CVA tenderness.      Comments: Mild tenderness "   Musculoskeletal: Normal range of motion.         General: Normal range of motion.   Neurological: no focal deficit. She is alert and oriented to person, place, and time.   Skin: Skin is warm, dry and not diaphoretic.   Psychiatric: Her behavior is normal. Mood normal.   Nursing note and vitals reviewed.      Assessment:     1. Cystitis with hematuria    2. Dysuria      Results for orders placed or performed in visit on 10/08/24   POCT Urinalysis, Dipstick, Automated, W/O Scope    Collection Time: 10/08/24  9:22 AM   Result Value Ref Range    POC Blood, Urine Positive (A) Negative    POC Bilirubin, Urine Negative Negative    POC Urobilinogen, Urine 0.1 0.1 - 1.1    POC Ketones, Urine Negative Negative    POC Protein, Urine Positive (A) Negative    POC Nitrates, Urine Negative Negative    POC Glucose, Urine Negative Negative    pH, UA 6     POC Specific Gravity, Urine 1.010 1.003 - 1.029    POC Leukocytes, Urine Positive (A) Negative       Plan:       Cystitis with hematuria  -     POCT Urinalysis, Dipstick, Automated, W/O Scope  -     Urine culture  -     nitrofurantoin, macrocrystal-monohydrate, (MACROBID) 100 MG capsule; Take 1 capsule (100 mg total) by mouth 2 (two) times daily. for 7 days  Dispense: 14 capsule; Refill: 0    Dysuria  -     Urine culture  -     phenazopyridine (PYRIDIUM) 200 MG tablet; Take 1 tablet (200 mg total) by mouth 3 (three) times daily as needed for Pain.  Dispense: 6 tablet; Refill: 0    Drink plenty of water. Tylenol for pain and fever. Decrease intake of caffeine, caffeine is an irritants to the bladder. Take antibiotics as prescribed - do not stop them early.If you need an antibiotic change, we will notify you in approximately 3 days. If you do not receive a call from us, continue the medication you received today.Return for any fever, vomiting, abdominal pain, or other concerns.

## 2024-10-08 NOTE — PATIENT INSTRUCTIONS
Drink plenty of water. Tylenol for pain and fever.  Decrease intake of caffeine, caffeine is an irritants to the bladder. Take antibiotics as prescribed - do not stop them early.If you need an antibiotic change, we will notify you in approximately 3 days. If you do not receive a call from us, continue the medication you received today.Return for any fever, vomiting, abdominal pain, or other concerns.

## 2024-10-10 LAB — BACTERIA UR CULT: ABNORMAL

## 2024-10-15 ENCOUNTER — TELEPHONE (OUTPATIENT)
Dept: URGENT CARE | Facility: CLINIC | Age: 79
End: 2024-10-15
Payer: MEDICARE

## 2024-10-15 PROCEDURE — 87077 CULTURE AEROBIC IDENTIFY: CPT | Performed by: FAMILY MEDICINE

## 2024-10-15 PROCEDURE — 87186 SC STD MICRODIL/AGAR DIL: CPT | Performed by: FAMILY MEDICINE

## 2024-10-15 PROCEDURE — 87086 URINE CULTURE/COLONY COUNT: CPT | Performed by: FAMILY MEDICINE

## 2024-10-15 NOTE — TELEPHONE ENCOUNTER
Patient was seen 10/8/2024 for burning urination. Patient completed Macrobid last night after 14 doses. Patient states burning urination is better but persistent and would like more medication.  TH

## 2024-10-17 ENCOUNTER — TELEPHONE (OUTPATIENT)
Dept: URGENT CARE | Facility: CLINIC | Age: 79
End: 2024-10-17
Payer: MEDICARE

## 2024-10-17 RX ORDER — SULFAMETHOXAZOLE AND TRIMETHOPRIM 800; 160 MG/1; MG/1
1 TABLET ORAL 2 TIMES DAILY
Qty: 10 TABLET | Refills: 0 | Status: SHIPPED | OUTPATIENT
Start: 2024-10-17 | End: 2024-10-22

## 2024-10-17 NOTE — TELEPHONE ENCOUNTER
Urine culture showed resistance to Macrobid.  Prescription for Bactrim was sent to the pharmacy.  Patient notified by phone.  She voiced understanding.

## 2024-11-04 ENCOUNTER — OFFICE VISIT (OUTPATIENT)
Dept: URGENT CARE | Facility: CLINIC | Age: 79
End: 2024-11-04
Payer: MEDICARE

## 2024-11-04 VITALS
BODY MASS INDEX: 34.04 KG/M2 | SYSTOLIC BLOOD PRESSURE: 165 MMHG | TEMPERATURE: 98 F | HEART RATE: 102 BPM | WEIGHT: 185 LBS | RESPIRATION RATE: 18 BRPM | HEIGHT: 62 IN | DIASTOLIC BLOOD PRESSURE: 80 MMHG

## 2024-11-04 DIAGNOSIS — J06.9 ACUTE URI: Primary | ICD-10-CM

## 2024-11-04 PROCEDURE — 99213 OFFICE O/P EST LOW 20 MIN: CPT | Mod: 25,,, | Performed by: PHYSICIAN ASSISTANT

## 2024-11-04 PROCEDURE — 96372 THER/PROPH/DIAG INJ SC/IM: CPT | Mod: ,,, | Performed by: PHYSICIAN ASSISTANT

## 2024-11-04 RX ORDER — BETAMETHASONE SODIUM PHOSPHATE AND BETAMETHASONE ACETATE 3; 3 MG/ML; MG/ML
6 INJECTION, SUSPENSION INTRA-ARTICULAR; INTRALESIONAL; INTRAMUSCULAR; SOFT TISSUE
Status: COMPLETED | OUTPATIENT
Start: 2024-11-04 | End: 2024-11-04

## 2024-11-04 RX ADMIN — BETAMETHASONE SODIUM PHOSPHATE AND BETAMETHASONE ACETATE 6 MG: 3; 3 INJECTION, SUSPENSION INTRA-ARTICULAR; INTRALESIONAL; INTRAMUSCULAR; SOFT TISSUE at 04:11

## 2024-11-04 NOTE — PROGRESS NOTES
"Subjective:      Patient ID: Neeru Watson is a 79 y.o. female.    Vitals:  height is 5' 2" (1.575 m) and weight is 83.9 kg (185 lb). Her temperature is 98.4 °F (36.9 °C). Her blood pressure is 165/80 (abnormal) and her pulse is 102. Her respiration is 18.     Chief Complaint: Cough     Patient is a 79 y.o. female who presents to urgent care with complaints of congestion, cough, and sore throat since this morning.  Patient denies fever, nausea, vomiting or diarrhea.    Cough      Respiratory:  Positive for cough.       Objective:     Physical Exam   Constitutional: She is oriented to person, place, and time. She appears well-developed. She is cooperative.  Non-toxic appearance. She does not appear ill. No distress.   HENT:   Head: Normocephalic and atraumatic.   Ears:   Right Ear: Hearing, tympanic membrane, external ear and ear canal normal.   Left Ear: Hearing, tympanic membrane, external ear and ear canal normal.   Nose: Nose normal. No nasal deformity. No epistaxis.   Mouth/Throat: Uvula is midline, oropharynx is clear and moist and mucous membranes are normal. No trismus in the jaw. Normal dentition. No uvula swelling. No oropharyngeal exudate, posterior oropharyngeal edema or posterior oropharyngeal erythema.   Eyes: Conjunctivae and lids are normal. No scleral icterus.   Neck: Trachea normal and phonation normal. Neck supple. No edema present. No erythema present. No neck rigidity present.   Cardiovascular: Normal rate, regular rhythm, normal heart sounds and normal pulses.   Pulmonary/Chest: Effort normal and breath sounds normal. No respiratory distress. She has no decreased breath sounds. She has no rhonchi.   Abdominal: Normal appearance.   Musculoskeletal: Normal range of motion.         General: No deformity. Normal range of motion.   Neurological: She is alert and oriented to person, place, and time. She exhibits normal muscle tone. Coordination normal.   Skin: Skin is warm, dry, intact, not " "diaphoretic and not pale.   Psychiatric: Her speech is normal and behavior is normal. Judgment and thought content normal.   Nursing note and vitals reviewed.       Previous History      Review of patient's allergies indicates:   Allergen Reactions    Hydrochlorothiazide Rash    Ketoconazole Rash       Past Medical History:   Diagnosis Date    Acute appendicitis with localized peritonitis, without perforation, abscess, or gangrene 12/10/2022    Formatting of this note might be different from the original. Added automatically from request for surgery [id number]  Last Assessment & Plan:  Formatting of this note might be different from the original. 1.5 weeks s/p lap appy  - pathology reviewed- c/w diagnosis - RUE ultrasound ordered for arm swelling - will call with results - RTC prn    Hypertension     Hyponatremia 01/20/2023    Pleuritic chest pain 01/19/2023     Current Outpatient Medications   Medication Instructions    ELIQUIS 5 mg Tab TAKE 1 TABLET BY MOUTH TWICE DAILY start in 1 week after completing loading dose    lisinopriL 10 MG tablet TAKE 1 TABLET BY MOUTH EVERY DAY    pyrilamine-chlophedianoL 12.5-12.5 mg/5 mL Liqd 10 mLs, Oral, 3 times daily    vitamin D (VITAMIN D3) 1,000 Units, Daily     Past Surgical History:   Procedure Laterality Date    APPENDECTOMY  12/10/2022    BREAST LUMPECTOMY      KNEE ARTHROSCOPY      LIPOMA RESECTION       Family History   Problem Relation Name Age of Onset    Liver cancer Mother      Heart disease Father      Hypertension Sister         Social History     Tobacco Use    Smoking status: Never    Smokeless tobacco: Never   Substance Use Topics    Alcohol use: Not Currently    Drug use: Never        Physical Exam      Vital Signs Reviewed   BP (!) 165/80   Pulse 102   Temp 98.4 °F (36.9 °C)   Resp 18   Ht 5' 2" (1.575 m)   Wt 83.9 kg (185 lb)   BMI 33.84 kg/m²        Procedures    Procedures     Labs     Results for orders placed or performed in visit on 10/15/24   Urine " culture    Collection Time: 10/15/24  2:51 PM    Specimen: Urine, Clean Catch   Result Value Ref Range    Urine Culture (A)      >/= 100,000 colonies/ml Klebsiella pneumoniae ssp pneumoniae       Susceptibility    Klebsiella pneumoniae ssp pneumoniae -  (no method available)     Amox/K Clav <=2 Sensitive      Ampicillin >=32 Resistant      Cefepime <=0.12 Sensitive      Ceftriaxone <=0.25 Sensitive      Cefuroxime 4 Sensitive      Ciprofloxacin 0.5 Intermediate      Gentamicin <=1 Sensitive      Levofloxacin 1 Intermediate      Meropenem <=0.25 Sensitive      Nitrofurantoin 256 Resistant      Piperacillin/Tazobactam <=4 Sensitive      Trimethoprim/Sulfamethoxazole <=20 Sensitive      Tetracycline 8 Intermediate      Tobramycin <=1 Sensitive        Assessment:     1. Acute URI        Plan:       Acute URI    Other orders  -     betamethasone acetate-betamethasone sodium phosphate injection 6 mg  -     pyrilamine-chlophedianoL 12.5-12.5 mg/5 mL Liqd; Take 10 mLs by mouth 3 (three) times daily.  Dispense: 180 mL; Refill: 0    Drink plenty of fluids.     Get plenty of rest.     Tylenol as needed.     Go to the ER with any significant change or worsening of symptoms.     Follow up with your primary care doctor.

## 2024-11-06 ENCOUNTER — TELEPHONE (OUTPATIENT)
Dept: INTERNAL MEDICINE | Facility: CLINIC | Age: 79
End: 2024-11-06
Payer: MEDICARE

## 2024-11-06 NOTE — TELEPHONE ENCOUNTER
Valerie called back stated she gave us the wrong information.  Stated patient is in need of an extraction and is on Eliquis.  Requesting instructions.

## 2024-11-06 NOTE — TELEPHONE ENCOUNTER
Spoke with Adelaida at Dental office, checking on status of clearance faxed over for Dr. Manrique's approval.  Adelaida stated clearance is for general purposes due to patient past medical history.  Please advise.

## 2024-11-07 ENCOUNTER — TELEPHONE (OUTPATIENT)
Dept: INTERNAL MEDICINE | Facility: CLINIC | Age: 79
End: 2024-11-07
Payer: MEDICARE

## 2024-11-07 NOTE — TELEPHONE ENCOUNTER
----- Message from Dedrick sent at 11/7/2024  2:46 PM CST -----  .Who Called: Neeru Watson    Caller is requesting assistance/information from provider's office.    Symptoms (please be specific): n/a   How long has patient had these symptoms:  n/a  List of preferred pharmacies on file (remove unneeded): [unfilled]  If different, enter pharmacy into here including location and phone number: n/a      Preferred Method of Contact: Phone Call    Patient's Preferred Phone Number on File: [phone]     Best Call Back Number, if different:    Additional Information: Pt sated she will be having a dental procedure and is wanting to know how long before procedure should she stop taking ELIQUIS 5 mg Tab. Pt would like a cb. Please advise, thank you.

## 2024-11-07 NOTE — TELEPHONE ENCOUNTER
Rosalio at Dental office advised, requesting written notice as well, written notice faxed per request.

## 2025-01-19 DIAGNOSIS — I10 PRIMARY HYPERTENSION: ICD-10-CM

## 2025-01-24 RX ORDER — LISINOPRIL 10 MG/1
TABLET ORAL
Qty: 90 TABLET | Refills: 3 | Status: SHIPPED | OUTPATIENT
Start: 2025-01-24

## 2025-01-26 ENCOUNTER — OFFICE VISIT (OUTPATIENT)
Dept: URGENT CARE | Facility: CLINIC | Age: 80
End: 2025-01-26
Payer: MEDICARE

## 2025-01-26 VITALS
RESPIRATION RATE: 17 BRPM | HEART RATE: 99 BPM | DIASTOLIC BLOOD PRESSURE: 82 MMHG | WEIGHT: 185 LBS | HEIGHT: 62 IN | SYSTOLIC BLOOD PRESSURE: 139 MMHG | TEMPERATURE: 98 F | OXYGEN SATURATION: 97 % | BODY MASS INDEX: 34.04 KG/M2

## 2025-01-26 DIAGNOSIS — H02.003: Primary | ICD-10-CM

## 2025-01-26 PROCEDURE — 99212 OFFICE O/P EST SF 10 MIN: CPT | Mod: ,,, | Performed by: PHYSICIAN ASSISTANT

## 2025-01-26 NOTE — PROGRESS NOTES
"Subjective:      Patient ID: Neeru Watson is a 79 y.o. female.    Vitals:  height is 5' 2" (1.575 m) and weight is 83.9 kg (185 lb). Her temperature is 97.9 °F (36.6 °C). Her blood pressure is 139/82 and her pulse is 99. Her respiration is 17 and oxygen saturation is 97%.     Chief Complaint: eyelash issue    Elderly female reports having chronic recurrent inverted eyelashes previously removed by her ophthalmologist's Dr Barreto having regrowth of eyelashes with right lower eyelid multiple eyelashes irritating eye and right upper eyelid when closing without fever redness or concern for infection.      Constitution: Negative for fever.   Eyes:  Negative for eye trauma, eye discharge, eye redness, photophobia, vision loss, double vision, blurred vision and eyelid swelling.   Skin:  Negative for erythema.      Objective:     Physical Exam   Constitutional: She is oriented to person, place, and time.  Non-toxic appearance.      Comments:Awake alert ambulatory elderly female     HENT:   Head: Normocephalic and atraumatic.   Eyes: Pupils are equal, round, and reactive to light. Lids are everted and swept, no foreign bodies found. Right eye exhibits no discharge and no hordeolum. No foreign body present in the right eye.     Extraocular movement intact vision grossly intact gaze aligned appropriately periorbital hyperpigmentation  Musculoskeletal: Normal range of motion.         General: Normal range of motion.   Neurological: no focal deficit. She is alert and oriented to person, place, and time. No cranial nerve deficit.   Skin: Skin is warm, dry and not diaphoretic. No erythema and No lesion   Psychiatric: Her mood appears anxious.       Assessment:     1. Eyelashes turned in, right        Plan:   Patient reports comfortable after 5 inverted eyelashes removed no complications.  Patient understands discharge plan with follow-up with her ophthalmologist if further I last removal needed.  Patient reports having sufficient " previously prescribed eyedrops and ointment for eyelid ready for discharge now.    Multiple offending in returning eyelashes removed today.  Recommend follow-up with ophthalmologist this week for further eyelash inversion irritation re-evaluation and removal as needed.  May apply ointment and eyedrops to avoid eye irritation and eyelid irritation over the next week  Eyelashes turned in, right

## 2025-01-26 NOTE — PATIENT INSTRUCTIONS
Multiple offending in returning eyelashes removed today.  Recommend follow-up with ophthalmologist this week for further eyelash inversion irritation re-evaluation and removal as needed.  May apply ointment and eyedrops to avoid eye irritation and eyelid irritation over the next week

## 2025-02-06 ENCOUNTER — TELEPHONE (OUTPATIENT)
Dept: INTERNAL MEDICINE | Facility: CLINIC | Age: 80
End: 2025-02-06
Payer: MEDICARE

## 2025-02-06 NOTE — TELEPHONE ENCOUNTER
----- Message from Nurse Odonnell sent at 2/6/2025  8:53 AM CST -----  Regarding: GODWIN Manrique 2/13/25 @0940  Are there any outstanding tasks in the chart? Patient will need nonfasting labs    Is there any documentation of tasks? no    Please tell patient to bring living will, power of , or advance directive document to visit if they have it.

## 2025-02-10 ENCOUNTER — LAB VISIT (OUTPATIENT)
Dept: LAB | Facility: HOSPITAL | Age: 80
End: 2025-02-10
Attending: INTERNAL MEDICINE
Payer: MEDICARE

## 2025-02-10 DIAGNOSIS — I10 PRIMARY HYPERTENSION: ICD-10-CM

## 2025-02-10 LAB
ANION GAP SERPL CALC-SCNC: 8 MEQ/L
BUN SERPL-MCNC: 11.6 MG/DL (ref 9.8–20.1)
CALCIUM SERPL-MCNC: 9.4 MG/DL (ref 8.4–10.2)
CHLORIDE SERPL-SCNC: 108 MMOL/L (ref 98–107)
CO2 SERPL-SCNC: 25 MMOL/L (ref 23–31)
CREAT SERPL-MCNC: 1.12 MG/DL (ref 0.55–1.02)
CREAT/UREA NIT SERPL: 10
GFR SERPLBLD CREATININE-BSD FMLA CKD-EPI: 50 ML/MIN/1.73/M2
GLUCOSE SERPL-MCNC: 125 MG/DL (ref 82–115)
POTASSIUM SERPL-SCNC: 3.9 MMOL/L (ref 3.5–5.1)
SODIUM SERPL-SCNC: 141 MMOL/L (ref 136–145)

## 2025-02-10 PROCEDURE — 80048 BASIC METABOLIC PNL TOTAL CA: CPT

## 2025-02-10 PROCEDURE — 36415 COLL VENOUS BLD VENIPUNCTURE: CPT

## 2025-02-13 ENCOUNTER — OFFICE VISIT (OUTPATIENT)
Dept: INTERNAL MEDICINE | Facility: CLINIC | Age: 80
End: 2025-02-13
Payer: MEDICARE

## 2025-02-13 VITALS
DIASTOLIC BLOOD PRESSURE: 72 MMHG | OXYGEN SATURATION: 98 % | HEART RATE: 84 BPM | WEIGHT: 184 LBS | SYSTOLIC BLOOD PRESSURE: 132 MMHG | BODY MASS INDEX: 33.86 KG/M2 | RESPIRATION RATE: 18 BRPM | HEIGHT: 62 IN

## 2025-02-13 DIAGNOSIS — I10 PRIMARY HYPERTENSION: ICD-10-CM

## 2025-02-13 DIAGNOSIS — L40.0 PLAQUE PSORIASIS: ICD-10-CM

## 2025-02-13 DIAGNOSIS — M81.0 AGE-RELATED OSTEOPOROSIS WITHOUT CURRENT PATHOLOGICAL FRACTURE: ICD-10-CM

## 2025-02-13 DIAGNOSIS — R53.83 FATIGUE, UNSPECIFIED TYPE: ICD-10-CM

## 2025-02-13 DIAGNOSIS — Z00.00 ENCOUNTER FOR MEDICARE ANNUAL WELLNESS EXAM: ICD-10-CM

## 2025-02-13 DIAGNOSIS — E55.9 VITAMIN D DEFICIENCY: ICD-10-CM

## 2025-02-13 DIAGNOSIS — Z12.31 VISIT FOR SCREENING MAMMOGRAM: Primary | ICD-10-CM

## 2025-02-13 DIAGNOSIS — N18.31 CHRONIC KIDNEY DISEASE, STAGE 3A: ICD-10-CM

## 2025-02-13 DIAGNOSIS — L98.9 SKIN LESION: ICD-10-CM

## 2025-02-13 RX ORDER — CLOTRIMAZOLE AND BETAMETHASONE DIPROPIONATE 10; .64 MG/G; MG/G
CREAM TOPICAL 2 TIMES DAILY PRN
Qty: 15 G | Refills: 3 | Status: SHIPPED | OUTPATIENT
Start: 2025-02-13

## 2025-02-13 NOTE — ASSESSMENT & PLAN NOTE
Health Maintenance Due   Topic Date Due    Pneumococcal Vaccines (Age 50+) (1 of 2 - PCV) Never done    Shingles Vaccine (1 of 2) Never done    DEXA Scan  12/04/2018    RSV Vaccine (Age 60+ and Pregnant patients) (1 - 1-dose 75+ series) Never done    TETANUS VACCINE  04/08/2023    COVID-19 Vaccine (1 - 2024-25 season) Never done     Recent labs reviewd and discussed.  Advance Care Planning     Date: 02/13/2025    Power of   I initiated the process of voluntary advance care planning today and explained the importance of this process to the patient.  I introduced the concept of advance directives to the patient, as well. Then the patient received detailed information about the importance of designating a Health Care Power of  (HCPOA). She was also instructed to communicate with this person about their wishes for future healthcare, should she become sick and lose decision-making capacity. The patient has not previously appointed a HCPOA. After our discussion, the patient has decided to complete a HCPOA and has appointed her daughter, health care agent:  Rosibel Watson  & health care agent number:  [phone] . I encouraged her to communicate with this person about their wishes for future healthcare, should she become sick and lose decision-making capacity.      A total of 5 min was spent on advance care planning, goals of care discussion, emotional support, formulating and communicating prognosis and exploring burden/benefit of various approaches of treatment. This discussion occurred on a fully voluntary basis with the verbal consent of the patient and/or family.

## 2025-02-13 NOTE — PROGRESS NOTES
Subjective:        Patient Care Team:  Melanie Manrique MD as PCP - General (Internal Medicine)  Melanie Manrique MD Pickney, Sherelle, FNP as Nurse Practitioner (Family Medicine)     Chief Complaint: Medicare AWV (Discuss labs )      HPI:  History of Present Illness    CHIEF COMPLAINT:  Patient presents today for follow up    MUSCULOSKELETAL:  She reports significant knee deterioration causing mobility issues, including difficulty getting onto exam tables. A knee specialist has indicated knee replacement as the only treatment option, which she declines.    DERMATOLOGIC:  She has a history of skin cancer removal. She experienced a psoriasis flare-up one month ago after being symptom-free for a year. She received a Stelara injection one month ago for management.    ENT:  She experiences sinus problems particularly during cold weather, fall, and spring, with sinus dripping during cold temperatures. She takes Claritin for seasonal symptom management.    She doesn't check her BP at home.    LABS:  Creatinine levels have fluctuated: 1.1 in August, 1.3 in February, and most recently 1.12. Blood sugar was 125 in a non-fasting state.    GENERAL:  She reports poor energy levels despite sleeping well.  She doesn't exercise because of her bad knees.    CURRENT MEDICATIONS:  She takes Eliquis, Lisinopril, and Claritin (not Claritin-D) seasonally.      ROS:  ROS as indicated in HPI.         Problem Noted   Encounter for Medicare Annual Wellness Exam 1/31/2023   History of Pulmonary Embolism 1/31/2024    She will need to remain on lifelong anticoagulation.     Fatigue 10/12/2023   Pulmonary Nodules 10/12/2023    She did see Dr. Medrano in March 2024 and no further w/u was recommended.       Paresthesia 5/22/2023   Abnormal Head CT 5/22/2023   Plaque Psoriasis 1/31/2023   Hypertension 5/20/2022   History of Breast Cancer 5/20/2022   Obesity 5/20/2022   Osteoporosis 5/20/2022    had bone density 12/2015 that showed osteoporosis  but patient opted not to treathad seen Dr. Pabon and was on injections for the osteoporosis in the past.  She thinks the last bone density was in 2012.  She has declined a repeat bmd     Colon Polyps 5/20/2022   Vitamin D Deficiency 5/20/2022   Skin Lesion (Resolved) 1/31/2024    She had a skin cancer removed from her nose.     Malignant Neoplasm of Left Female Breast, Unspecified Estrogen Receptor Status, Unspecified Site of Breast (Resolved) 1/31/2023   Bilateral Pulmonary Embolism (Resolved) 1/20/2023   Hyponatremia (Resolved) 1/20/2023   Pleuritic Chest Pain (Resolved) 1/19/2023   Acute Appendicitis With Localized Peritonitis, Without Perforation, Abscess, Or Gangrene (Resolved) 12/10/2022    Formatting of this note might be different from the original.  Added automatically from request for surgery [id number]    Last Assessment & Plan:   Formatting of this note might be different from the original.  1.5 weeks s/p lap appy    - pathology reviewed- c/w diagnosis  - RUE ultrasound ordered for arm swelling  - will call with results  - RTC prn          The patient's Health Maintenance was reviewed and the following appears to be due:   Health Maintenance Due   Topic Date Due    Pneumococcal Vaccines (Age 50+) (1 of 2 - PCV) Never done    Shingles Vaccine (1 of 2) Never done    DEXA Scan  12/04/2018    RSV Vaccine (Age 60+ and Pregnant patients) (1 - 1-dose 75+ series) Never done    TETANUS VACCINE  04/08/2023    COVID-19 Vaccine (1 - 2024-25 season) Never done       Problem List  Active Problem List with Overview Notes    Diagnosis Date Noted    Encounter for Medicare annual wellness exam 01/31/2023    History of pulmonary embolism 01/31/2024     She will need to remain on lifelong anticoagulation.      Fatigue 10/12/2023    Pulmonary nodules 10/12/2023     She did see Dr. Medrano in March 2024 and no further w/u was recommended.        Paresthesia 05/22/2023    Abnormal head CT 05/22/2023    Plaque psoriasis  01/31/2023    Hypertension 05/20/2022    History of breast cancer 05/20/2022    Obesity 05/20/2022    Osteoporosis 05/20/2022     had bone density 12/2015 that showed osteoporosis but patient opted not to treathad seen Dr. Pabon and was on injections for the osteoporosis in the past.  She thinks the last bone density was in 2012.  She has declined a repeat bmd      Colon polyps 05/20/2022    Vitamin D deficiency 05/20/2022       Past Medical History:  Past Medical History:   Diagnosis Date    Acute appendicitis with localized peritonitis, without perforation, abscess, or gangrene 12/10/2022    Formatting of this note might be different from the original. Added automatically from request for surgery [id number]  Last Assessment & Plan:  Formatting of this note might be different from the original. 1.5 weeks s/p lap appy  - pathology reviewed- c/w diagnosis - RUE ultrasound ordered for arm swelling - will call with results - RTC prn    Hypertension     Hyponatremia 01/20/2023    Pleuritic chest pain 01/19/2023    Skin lesion 01/31/2024    She had a skin cancer removed from her nose.       Past Surgical History:   Procedure Laterality Date    APPENDECTOMY  12/10/2022    BREAST LUMPECTOMY      KNEE ARTHROSCOPY      LIPOMA RESECTION       Review of patient's allergies indicates:   Allergen Reactions    Hydrochlorothiazide Rash    Ketoconazole Rash     Current Outpatient Medications on File Prior to Visit   Medication Sig Dispense Refill    ELIQUIS 5 mg Tab TAKE 1 TABLET BY MOUTH TWICE DAILY start in 1 week after completing loading dose 180 tablet 3    lisinopriL 10 MG tablet TAKE 1 TABLET BY MOUTH EVERY DAY 90 tablet 3    vitamin D (VITAMIN D3) 1000 units Tab Take 1,000 Units by mouth once daily.      [DISCONTINUED] pyrilamine-chlophedianoL 12.5-12.5 mg/5 mL Liqd Take 10 mLs by mouth 3 (three) times daily. (Patient not taking: Reported on 1/26/2025) 180 mL 0     No current facility-administered medications on file prior to  "visit.     Social History     Socioeconomic History    Marital status:    Tobacco Use    Smoking status: Never     Passive exposure: Never    Smokeless tobacco: Never   Substance and Sexual Activity    Alcohol use: Not Currently    Drug use: Never    Sexual activity: Not Currently     Social Drivers of Health     Food Insecurity: Unknown (5/22/2023)    Hunger Vital Sign     Worried About Running Out of Food in the Last Year: Never true   Transportation Needs: Unknown (5/22/2023)    PRAPARE - Transportation     Lack of Transportation (Medical): No   Housing Stability: Unknown (5/22/2023)    Housing Stability Vital Sign     Unable to Pay for Housing in the Last Year: No     Family History   Problem Relation Name Age of Onset    Liver cancer Mother      Heart disease Father      Hypertension Sister         Review of Systems        Objective:   /72 (BP Location: Right arm, Patient Position: Sitting)   Pulse 84   Resp 18   Ht 5' 2.01" (1.575 m)   Wt 83.5 kg (184 lb)   SpO2 98%   BMI 33.65 kg/m²     Physical Exam  Constitutional:       General: She is not in acute distress.     Appearance: Normal appearance.   HENT:      Head: Normocephalic and atraumatic.   Eyes:      General: No scleral icterus.     Conjunctiva/sclera: Conjunctivae normal.   Neck:      Vascular: No carotid bruit.   Cardiovascular:      Rate and Rhythm: Normal rate and regular rhythm.      Pulses: Normal pulses.      Heart sounds: Normal heart sounds. No murmur heard.     No friction rub. No gallop.   Pulmonary:      Effort: Pulmonary effort is normal.      Breath sounds: Normal breath sounds.   Abdominal:      General: Bowel sounds are normal.      Palpations: Abdomen is soft. There is no mass.      Tenderness: There is no abdominal tenderness. There is no guarding or rebound.   Musculoskeletal:         General: No deformity.      Cervical back: No rigidity or tenderness.      Right lower leg: No edema.      Left lower leg: No edema. "   Lymphadenopathy:      Cervical: No cervical adenopathy.   Skin:     Coloration: Skin is not jaundiced or pale.      Findings: Rash (intertrigo under crease of pannus -- patches of erythema) present. No erythema.   Neurological:      General: No focal deficit present.      Mental Status: She is alert and oriented to person, place, and time.      Gait: Gait normal.   Psychiatric:         Mood and Affect: Mood normal.         Behavior: Behavior normal.         Thought Content: Thought content normal.         Judgment: Judgment normal.         Assessment and Plan:     Encounter for Medicare annual wellness exam  Health Maintenance Due   Topic Date Due    Pneumococcal Vaccines (Age 50+) (1 of 2 - PCV) Never done    Shingles Vaccine (1 of 2) Never done    DEXA Scan  12/04/2018    RSV Vaccine (Age 60+ and Pregnant patients) (1 - 1-dose 75+ series) Never done    TETANUS VACCINE  04/08/2023    COVID-19 Vaccine (1 - 2024-25 season) Never done     Recent labs reviewd and discussed.  Advance Care Planning    Date: 02/13/2025    Power of   I initiated the process of voluntary advance care planning today and explained the importance of this process to the patient.  I introduced the concept of advance directives to the patient, as well. Then the patient received detailed information about the importance of designating a Health Care Power of  (HCPOA). She was also instructed to communicate with this person about their wishes for future healthcare, should she become sick and lose decision-making capacity. The patient has not previously appointed a HCPOA. After our discussion, the patient has decided to complete a HCPOA and has appointed her daughter, health care agent:  Rosibel Watson  & health care agent number:  [phone] . I encouraged her to communicate with this person about their wishes for future healthcare, should she become sick and lose decision-making capacity.      A total of 5 min was spent on advance  care planning, goals of care discussion, emotional support, formulating and communicating prognosis and exploring burden/benefit of various approaches of treatment. This discussion occurred on a fully voluntary basis with the verbal consent of the patient and/or family.         Fatigue  I encouraged regular exercise.    Plaque psoriasis  She is followed by Dr. Fine and is on Stelara.    Hypertension  Stable.  Continue lisionpril  I rec she get a new cuff and monitor at home.   Follow up in about 6 months (around 8/13/2025).    Medications Ordered This Encounter   Medications    clotrimazole-betamethasone 1-0.05% (LOTRISONE) cream     Sig: Apply topically 2 (two) times daily as needed (rash).     Dispense:  15 g     Refill:  3     [unfilled]  Orders Placed This Encounter   Procedures    Mammo Digital Screening Bilat w/ Abdoulaye (XPD)     Standing Status:   Future     Number of Occurrences:   1     Standing Expiration Date:   2/13/2027     Order Specific Question:   May the Radiologist modify the order per protocol to meet the clinical needs of the patient?     Answer:   Yes     Order Specific Question:   Release to patient     Answer:   Immediate         A comprehensive HEALTH RISK ASSESSMENT was completed today. Results are summarized below:    There are NO EMOTIONAL/SOCIAL CONCERNS identified on today's screening for Social Isolation, Depression and Anxiety.    There are NO COGNITIVE FUNCTION CONCERNS identified on today's screening.  There are NO FUNCTIONAL OR SAFETY CONCERNS were identified on today's screening for Physical Symptoms, Nutritional, Cognitive Function, Home Safety/Living Situation, Fall Risk, Activities of Daily Living, Independent Activities of Daily Living, Physical Activity, Timed Up and Go test and Whisper test.   The patient reports NO OPIOID PRESCRIPTIONS. This was confirmed through medication reconciliation and the [facility] website.    The patient is NOT A TOBACCO USER.        All Questions  regarding food, transportation or housing were not answered today.    Follow up in about 6 months (around 8/13/2025). In addition to their scheduled follow up, the patient has also been instructed to follow up on as needed basis.   This note was generated with the assistance of ambient listening technology. Verbal consent was obtained by the patient and accompanying visitor(s) for the recording of patient appointment to facilitate this note. I attest to having reviewed and edited the generated note for accuracy, though some syntax or spelling errors may persist. Please contact the author of this note for any clarification.

## 2025-03-06 DIAGNOSIS — R92.8 ABNORMAL MAMMOGRAM: Primary | ICD-10-CM

## 2025-03-17 ENCOUNTER — DOCUMENTATION ONLY (OUTPATIENT)
Dept: INTERNAL MEDICINE | Facility: CLINIC | Age: 80
End: 2025-03-17
Payer: MEDICARE

## 2025-05-15 ENCOUNTER — OFFICE VISIT (OUTPATIENT)
Dept: INTERNAL MEDICINE | Facility: CLINIC | Age: 80
End: 2025-05-15
Payer: MEDICARE

## 2025-05-15 VITALS
BODY MASS INDEX: 32.57 KG/M2 | DIASTOLIC BLOOD PRESSURE: 70 MMHG | RESPIRATION RATE: 18 BRPM | WEIGHT: 177 LBS | HEART RATE: 82 BPM | SYSTOLIC BLOOD PRESSURE: 126 MMHG | HEIGHT: 62 IN | OXYGEN SATURATION: 97 %

## 2025-05-15 DIAGNOSIS — H53.8 BLURRY VISION: Primary | ICD-10-CM

## 2025-05-15 PROCEDURE — 99213 OFFICE O/P EST LOW 20 MIN: CPT | Mod: ,,, | Performed by: INTERNAL MEDICINE

## 2025-05-15 NOTE — PROGRESS NOTES
Subjective:      Chief Complaint: Medication Problem (She thinks her eliquis has changed her eye sight, she read online that could happen. Her vision is blurred. )      HPI:She is here with c/o her vision changing.  She notices blurriness in her distance vision.  She saw him in December.  And he told her it was unchanged.  She starting noticing problems about 3-4 weeks ago.  She noticed that she can't read the words on the bottom of her screen.  She notices it with both eyes. She usually just uses readers.      She's been cutting back and she's lost 7 pounds.    Problem Noted   Encounter for Medicare Annual Wellness Exam 1/31/2023   Blurry Vision 5/15/2025   History of Pulmonary Embolism 1/31/2024    She will need to remain on lifelong anticoagulation.     Fatigue 10/12/2023   Pulmonary Nodules 10/12/2023    She did see Dr. Medrano in March 2024 and no further w/u was recommended.       Paresthesia 5/22/2023   Abnormal Head CT 5/22/2023   Plaque Psoriasis 1/31/2023   Hypertension 5/20/2022   History of Breast Cancer 5/20/2022   Obesity 5/20/2022   Osteoporosis 5/20/2022    had bone density 12/2015 that showed osteoporosis but patient opted not to treathad seen Dr. Pabon and was on injections for the osteoporosis in the past.  She thinks the last bone density was in 2012.  She has declined a repeat bmd     Colon Polyps 5/20/2022   Vitamin D Deficiency 5/20/2022   Skin Lesion (Resolved) 1/31/2024    She had a skin cancer removed from her nose.     Malignant Neoplasm of Left Female Breast, Unspecified Estrogen Receptor Status, Unspecified Site of Breast (Resolved) 1/31/2023   Bilateral Pulmonary Embolism (Resolved) 1/20/2023   Hyponatremia (Resolved) 1/20/2023   Pleuritic Chest Pain (Resolved) 1/19/2023   Acute Appendicitis With Localized Peritonitis, Without Perforation, Abscess, Or Gangrene (Resolved) 12/10/2022    Formatting of this note might be different from the original.  Added automatically from request for  "surgery [id number]    Last Assessment & Plan:   Formatting of this note might be different from the original.  1.5 weeks s/p lap appy    - pathology reviewed- c/w diagnosis  - RUE ultrasound ordered for arm swelling  - will call with results  - RTC prn          The patient's Health Maintenance was reviewed and the following appears to be due:   Health Maintenance Due   Topic Date Due    Pneumococcal Vaccines (Age 50+) (1 of 2 - PCV) Never done    Shingles Vaccine (1 of 2) Never done    DEXA Scan  12/04/2018    RSV Vaccine (Age 60+ and Pregnant patients) (1 - 1-dose 75+ series) Never done    TETANUS VACCINE  04/08/2023    COVID-19 Vaccine (1 - 2024-25 season) Never done       Past Medical History:  Past Medical History:   Diagnosis Date    Acute appendicitis with localized peritonitis, without perforation, abscess, or gangrene 12/10/2022    Formatting of this note might be different from the original. Added automatically from request for surgery [id number]  Last Assessment & Plan:  Formatting of this note might be different from the original. 1.5 weeks s/p lap appy  - pathology reviewed- c/w diagnosis - RUE ultrasound ordered for arm swelling - will call with results - RTC prn    Hypertension     Hyponatremia 01/20/2023    Pleuritic chest pain 01/19/2023    Skin lesion 01/31/2024    She had a skin cancer removed from her nose.       Review of patient's allergies indicates:   Allergen Reactions    Hydrochlorothiazide Rash    Ketoconazole Rash     Medications Ordered Prior to Encounter[1]    Review of Systems    Objective:   /70 (BP Location: Right arm, Patient Position: Sitting)   Pulse 82   Resp 18   Ht 5' 2.01" (1.575 m)   Wt 80.3 kg (177 lb)   SpO2 97%   BMI 32.37 kg/m²     Physical Exam  Vitals reviewed.   Constitutional:       General: She is not in acute distress.     Appearance: Normal appearance. She is not ill-appearing or diaphoretic.   HENT:      Head: Normocephalic and atraumatic.   Pulmonary:      " Effort: Pulmonary effort is normal.   Skin:     General: Skin is warm and dry.   Neurological:      General: No focal deficit present.      Mental Status: She is alert.   Psychiatric:         Mood and Affect: Mood normal.         Behavior: Behavior normal.         Thought Content: Thought content normal.         Judgment: Judgment normal.       Assessment and Plan:     Blurry vision  I provided reassuranc that its not the Eliquis.   I rec she schedule an appt with her eye doctor for further evaluation.      No follow-ups on file.       [unfilled]  No orders of the defined types were placed in this encounter.           [1]   Current Outpatient Medications on File Prior to Visit   Medication Sig Dispense Refill    clotrimazole-betamethasone 1-0.05% (LOTRISONE) cream Apply topically 2 (two) times daily as needed (rash). 15 g 3    ELIQUIS 5 mg Tab TAKE 1 TABLET BY MOUTH TWICE DAILY start in 1 week after completing loading dose 180 tablet 3    lisinopriL 10 MG tablet TAKE 1 TABLET BY MOUTH EVERY DAY 90 tablet 3    vitamin D (VITAMIN D3) 1000 units Tab Take 1,000 Units by mouth once daily.       No current facility-administered medications on file prior to visit.

## 2025-05-15 NOTE — ASSESSMENT & PLAN NOTE
I provided reassuranc that its not the Eliquis.   I rec she schedule an appt with her eye doctor for further evaluation.

## 2025-05-26 ENCOUNTER — OFFICE VISIT (OUTPATIENT)
Dept: INTERNAL MEDICINE | Facility: CLINIC | Age: 80
End: 2025-05-26
Payer: MEDICARE

## 2025-05-26 VITALS
HEIGHT: 62 IN | DIASTOLIC BLOOD PRESSURE: 68 MMHG | BODY MASS INDEX: 32.2 KG/M2 | HEART RATE: 94 BPM | SYSTOLIC BLOOD PRESSURE: 122 MMHG | RESPIRATION RATE: 18 BRPM | OXYGEN SATURATION: 97 % | WEIGHT: 175 LBS

## 2025-05-26 DIAGNOSIS — Z85.3 HISTORY OF BREAST CANCER: ICD-10-CM

## 2025-05-26 DIAGNOSIS — R53.81 MALAISE: Primary | ICD-10-CM

## 2025-05-26 DIAGNOSIS — I10 PRIMARY HYPERTENSION: ICD-10-CM

## 2025-05-26 DIAGNOSIS — R51.9 NONINTRACTABLE EPISODIC HEADACHE, UNSPECIFIED HEADACHE TYPE: ICD-10-CM

## 2025-05-26 LAB
ALBUMIN SERPL-MCNC: 3.8 G/DL (ref 3.4–4.8)
ALBUMIN/GLOB SERPL: 1 RATIO (ref 1.1–2)
ALP SERPL-CCNC: 111 UNIT/L (ref 40–150)
ALT SERPL-CCNC: 24 UNIT/L (ref 0–55)
ANION GAP SERPL CALC-SCNC: 10 MEQ/L
AST SERPL-CCNC: 30 UNIT/L (ref 11–45)
BASOPHILS # BLD AUTO: 0.05 X10(3)/MCL
BASOPHILS NFR BLD AUTO: 0.6 %
BILIRUB SERPL-MCNC: 0.6 MG/DL
BUN SERPL-MCNC: 14.1 MG/DL (ref 9.8–20.1)
CALCIUM SERPL-MCNC: 9.2 MG/DL (ref 8.4–10.2)
CHLORIDE SERPL-SCNC: 110 MMOL/L (ref 98–107)
CO2 SERPL-SCNC: 21 MMOL/L (ref 23–31)
CREAT SERPL-MCNC: 1.07 MG/DL (ref 0.55–1.02)
CREAT/UREA NIT SERPL: 13
CRP SERPL HS-MCNC: 14.53 MG/L
D DIMER PPP IA.FEU-MCNC: <0.27 UG/ML FEU (ref 0–0.5)
EOSINOPHIL # BLD AUTO: 0.11 X10(3)/MCL (ref 0–0.9)
EOSINOPHIL NFR BLD AUTO: 1.3 %
ERYTHROCYTE [DISTWIDTH] IN BLOOD BY AUTOMATED COUNT: 13.7 % (ref 11.5–17)
ERYTHROCYTE [SEDIMENTATION RATE] IN BLOOD: 36 MM/HR (ref 0–30)
GFR SERPLBLD CREATININE-BSD FMLA CKD-EPI: 53 ML/MIN/1.73/M2
GLOBULIN SER-MCNC: 3.7 GM/DL (ref 2.4–3.5)
GLUCOSE SERPL-MCNC: 105 MG/DL (ref 82–115)
HCT VFR BLD AUTO: 45.3 % (ref 37–47)
HGB BLD-MCNC: 15 G/DL (ref 12–16)
IMM GRANULOCYTES # BLD AUTO: 0.04 X10(3)/MCL (ref 0–0.04)
IMM GRANULOCYTES NFR BLD AUTO: 0.5 %
LYMPHOCYTES # BLD AUTO: 1.66 X10(3)/MCL (ref 0.6–4.6)
LYMPHOCYTES NFR BLD AUTO: 19.4 %
MCH RBC QN AUTO: 32.7 PG (ref 27–31)
MCHC RBC AUTO-ENTMCNC: 33.1 G/DL (ref 33–36)
MCV RBC AUTO: 98.7 FL (ref 80–94)
MONOCYTES # BLD AUTO: 0.61 X10(3)/MCL (ref 0.1–1.3)
MONOCYTES NFR BLD AUTO: 7.1 %
NEUTROPHILS # BLD AUTO: 6.1 X10(3)/MCL (ref 2.1–9.2)
NEUTROPHILS NFR BLD AUTO: 71.1 %
NRBC BLD AUTO-RTO: 0 %
PLATELET # BLD AUTO: 250 X10(3)/MCL (ref 130–400)
PMV BLD AUTO: 12.5 FL (ref 7.4–10.4)
POTASSIUM SERPL-SCNC: 3.6 MMOL/L (ref 3.5–5.1)
PROT SERPL-MCNC: 7.5 GM/DL (ref 5.8–7.6)
RBC # BLD AUTO: 4.59 X10(6)/MCL (ref 4.2–5.4)
SODIUM SERPL-SCNC: 141 MMOL/L (ref 136–145)
TROPONIN I SERPL-MCNC: <0.01 NG/ML (ref 0–0.04)
TSH SERPL-ACNC: 1.66 UIU/ML (ref 0.35–4.94)
WBC # BLD AUTO: 8.57 X10(3)/MCL (ref 4.5–11.5)

## 2025-05-26 PROCEDURE — 85025 COMPLETE CBC W/AUTO DIFF WBC: CPT | Performed by: INTERNAL MEDICINE

## 2025-05-26 PROCEDURE — 80053 COMPREHEN METABOLIC PANEL: CPT | Performed by: INTERNAL MEDICINE

## 2025-05-26 PROCEDURE — 84443 ASSAY THYROID STIM HORMONE: CPT | Performed by: INTERNAL MEDICINE

## 2025-05-26 PROCEDURE — 85379 FIBRIN DEGRADATION QUANT: CPT | Performed by: INTERNAL MEDICINE

## 2025-05-26 PROCEDURE — 86141 C-REACTIVE PROTEIN HS: CPT | Performed by: INTERNAL MEDICINE

## 2025-05-26 PROCEDURE — 84484 ASSAY OF TROPONIN QUANT: CPT | Performed by: INTERNAL MEDICINE

## 2025-05-26 PROCEDURE — 85652 RBC SED RATE AUTOMATED: CPT | Performed by: INTERNAL MEDICINE

## 2025-05-26 NOTE — PROGRESS NOTES
Subjective:      Chief Complaint: Dizziness (C/o dizziness and weakness/C/o being sleepy )      HPI:She c/o feeling dizzy, weak, and no appetite.  Her sx started about a week or so ago.  She has been dieting -- 800-900 calories per day.  She isn't checking her BP at home.  She is no longer having the blurry vision.  No fevers or chills.  Her bowels are moving fine.  She is sleeping fine.  She wants to fall asleep every time she sits down.  She is not really having any pain.  She does have some posterior right knee pain and in her right calf its sore.  Denies sob and chest pain.  She did have a sl frontal headache this morning and some pain across her chest last week, but that resolved and never returned.  She reports compliance with her eliquis.  No cough.  She feels off balance but not dizzy or light headed.  She notices sx are worse if she gets up and tries to walk across the room.  Problem Noted   Encounter for Medicare Annual Wellness Exam 1/31/2023   Malaise 5/26/2025   Blurry Vision 5/15/2025   History of Pulmonary Embolism 1/31/2024    She will need to remain on lifelong anticoagulation.     Fatigue 10/12/2023   Pulmonary Nodules 10/12/2023    She did see Dr. Medrano in March 2024 and no further w/u was recommended.       Paresthesia 5/22/2023   Abnormal Head CT 5/22/2023   Plaque Psoriasis 1/31/2023   Hypertension 5/20/2022   History of Breast Cancer 5/20/2022   Obesity 5/20/2022   Osteoporosis 5/20/2022    had bone density 12/2015 that showed osteoporosis but patient opted not to treathad seen Dr. Pabon and was on injections for the osteoporosis in the past.  She thinks the last bone density was in 2012.  She has declined a repeat bmd     Colon Polyps 5/20/2022   Vitamin D Deficiency 5/20/2022   Skin Lesion (Resolved) 1/31/2024    She had a skin cancer removed from her nose.     Malignant Neoplasm of Left Female Breast, Unspecified Estrogen Receptor Status, Unspecified Site of Breast (Resolved) 1/31/2023    Bilateral Pulmonary Embolism (Resolved) 1/20/2023   Hyponatremia (Resolved) 1/20/2023   Pleuritic Chest Pain (Resolved) 1/19/2023   Acute Appendicitis With Localized Peritonitis, Without Perforation, Abscess, Or Gangrene (Resolved) 12/10/2022    Formatting of this note might be different from the original.  Added automatically from request for surgery [id number]    Last Assessment & Plan:   Formatting of this note might be different from the original.  1.5 weeks s/p lap appy    - pathology reviewed- c/w diagnosis  - RUE ultrasound ordered for arm swelling  - will call with results  - RTC prn          The patient's Health Maintenance was reviewed and the following appears to be due:   Health Maintenance Due   Topic Date Due    Pneumococcal Vaccines (Age 50+) (1 of 2 - PCV) Never done    Shingles Vaccine (1 of 2) Never done    DEXA Scan  12/04/2018    RSV Vaccine (Age 60+ and Pregnant patients) (1 - 1-dose 75+ series) Never done    TETANUS VACCINE  04/08/2023    COVID-19 Vaccine (1 - 2024-25 season) Never done       Past Medical History:  Past Medical History:   Diagnosis Date    Acute appendicitis with localized peritonitis, without perforation, abscess, or gangrene 12/10/2022    Formatting of this note might be different from the original. Added automatically from request for surgery [id number]  Last Assessment & Plan:  Formatting of this note might be different from the original. 1.5 weeks s/p lap appy  - pathology reviewed- c/w diagnosis - RUE ultrasound ordered for arm swelling - will call with results - RTC prn    Hypertension     Hyponatremia 01/20/2023    Pleuritic chest pain 01/19/2023    Skin lesion 01/31/2024    She had a skin cancer removed from her nose.       Review of patient's allergies indicates:   Allergen Reactions    Hydrochlorothiazide Rash    Ketoconazole Rash     Medications Ordered Prior to Encounter[1]    Review of Systems    Objective:   /68 (BP Location: Right arm, Patient Position:  "Sitting)   Pulse 94   Resp 18   Ht 5' 2.01" (1.575 m)   Wt 79.4 kg (175 lb)   SpO2 97%   BMI 32.00 kg/m²     Physical Exam  Constitutional:       General: She is not in acute distress.     Appearance: Normal appearance.   HENT:      Head: Normocephalic and atraumatic.   Eyes:      General: No scleral icterus.     Conjunctiva/sclera: Conjunctivae normal.   Neck:      Vascular: No carotid bruit.   Cardiovascular:      Rate and Rhythm: Normal rate and regular rhythm.      Pulses: Normal pulses.      Heart sounds: Normal heart sounds. No murmur heard.     No friction rub. No gallop.   Pulmonary:      Effort: Pulmonary effort is normal.      Breath sounds: Normal breath sounds.   Abdominal:      General: Bowel sounds are normal.      Palpations: Abdomen is soft. There is no mass.      Tenderness: There is no abdominal tenderness. There is no guarding or rebound.   Musculoskeletal:         General: No deformity.      Cervical back: No rigidity or tenderness.      Right lower leg: No edema.      Left lower leg: No edema.   Lymphadenopathy:      Cervical: No cervical adenopathy.   Skin:     General: Skin is warm and dry.      Coloration: Skin is not jaundiced or pale.      Findings: No erythema.   Neurological:      General: No focal deficit present.      Mental Status: She is alert and oriented to person, place, and time.      Gait: Gait normal.   Psychiatric:         Mood and Affect: Mood normal.         Behavior: Behavior normal.         Thought Content: Thought content normal.         Judgment: Judgment normal.      Comments: Sl flat affect       Assessment and Plan:     Malaise  Will get EKG and labs to further evaluate.  Could be dehdyration, pmr, atypical anginal equivalent, hyperglycemia, arrhythmia, recurrent clot though she is compliant with her Eliquis.  There are other things also on the differential.  Hopefully the labs and EKG will help narrow it down.  I've also orderedd an MRI.  She had an old MRI done " 2023 that was concerning for hypodensity in frontal lobe.  Will repeat to f/u given her nonspecific sx, h/o breast cancer and intermittent headache/nausea.    Hypertension  BP is ok, but she isn't checking at home.      No follow-ups on file.       [unfilled]  Orders Placed This Encounter   Procedures    MRI Brain Without Contrast     Standing Status:   Future     Expected Date:   5/26/2025     Expiration Date:   5/26/2026     Does the patient have or ever had a pacemaker or a defibrillator (Note: Some facilities may not be able to schedule an MRI for patients with pacemakers and defibrillators. You should contact your local radiology dept to determine if this is the case.)?:   No     Does the patient have an aneurysm or surgical clip, pump, nerve/brain stimulator, middle/inner ear prosthesis, or other metal implant or foreign object (bullet, shrapnel)? If they have a card related to their implant, ask them to bring it. Issues related to the implant may cause the MRI to be delayed.:   No     Will the patient require po anxiolysis or sedation?:   No     May the Radiologist modify the order per protocol to meet the clinical needs of the patient?:   Yes     Does the patient have on a skin patch for medication with aluminized backing?:   No    Urinalysis, Reflex to Urine Culture Urine, Clean Catch     Standing Status:   Future     Expected Date:   5/26/2025     Expiration Date:   7/25/2026     Preferred Collection Type:   Urine, Clean Catch     Specimen Source:   Urine    D-Dimer, Quantitative           Standing Status:   Future     Number of Occurrences:   1     Expected Date:   5/26/2025     Expiration Date:   8/24/2026    CBC Auto Differential           Standing Status:   Future     Number of Occurrences:   1     Expected Date:   5/26/2025     Expiration Date:   7/25/2026    Comprehensive Metabolic Panel           Standing Status:   Future     Number of Occurrences:   1     Expected Date:   5/26/2025     Expiration  Date:   7/25/2026    CRP, High Sensitivity           Standing Status:   Future     Number of Occurrences:   1     Expected Date:   5/26/2025     Expiration Date:   8/24/2026    Sedimentation rate           Standing Status:   Future     Number of Occurrences:   1     Expected Date:   5/26/2025     Expiration Date:   8/26/2025    Troponin I           Standing Status:   Future     Number of Occurrences:   1     Expected Date:   5/26/2025     Expiration Date:   8/24/2026    TSH           Standing Status:   Future     Number of Occurrences:   1     Expected Date:   5/26/2025     Expiration Date:   7/25/2026    CBC with Differential                  [1]   Current Outpatient Medications on File Prior to Visit   Medication Sig Dispense Refill    clotrimazole-betamethasone 1-0.05% (LOTRISONE) cream Apply topically 2 (two) times daily as needed (rash). 15 g 3    ELIQUIS 5 mg Tab TAKE 1 TABLET BY MOUTH TWICE DAILY start in 1 week after completing loading dose 180 tablet 3    lisinopriL 10 MG tablet TAKE 1 TABLET BY MOUTH EVERY DAY 90 tablet 3    vitamin D (VITAMIN D3) 1000 units Tab Take 1,000 Units by mouth once daily.       No current facility-administered medications on file prior to visit.

## 2025-05-26 NOTE — ASSESSMENT & PLAN NOTE
Will get EKG and labs to further evaluate.  Could be dehdyration, pmr, atypical anginal equivalent, hyperglycemia, arrhythmia, recurrent clot though she is compliant with her Eliquis.  There are other things also on the differential.  Hopefully the labs and EKG will help narrow it down.  I've also orderedd an MRI.  She had an old MRI done 2023 that was concerning for hypodensity in frontal lobe.  Will repeat to f/u given her nonspecific sx, h/o breast cancer and intermittent headache/nausea.

## 2025-05-27 ENCOUNTER — TELEPHONE (OUTPATIENT)
Dept: INTERNAL MEDICINE | Facility: CLINIC | Age: 80
End: 2025-05-27
Payer: MEDICARE

## 2025-05-27 NOTE — TELEPHONE ENCOUNTER
Copied from CRM #[id number]. Topic: General Inquiry - Patient Advice  >> May 27, 2025  1:57 PM Dedrick wrote:  .Who Called: Neeru Watson    Caller is requesting assistance/information from provider's office.    Symptoms (please be specific): N/A   How long has patient had these symptoms:  N/A  List of preferred pharmacies on file (remove unneeded): [unfilled]  If different, enter pharmacy into here including location and phone number: N/A    Preferred Method of Contact: Phone Call    Patient's Preferred Phone Number on File: [phone]     Best Call Back Number, if different:    Additional Information: Pt called requesting a cb regarding her MRI Brain. Please advise, thank you.

## 2025-05-27 NOTE — TELEPHONE ENCOUNTER
Patient requested order be sent to [facility] instead of [facility].  Done per request.  Patient aware they will call her to schedule appt.

## 2025-05-30 ENCOUNTER — RESULTS FOLLOW-UP (OUTPATIENT)
Dept: INTERNAL MEDICINE | Facility: CLINIC | Age: 80
End: 2025-05-30

## 2025-05-30 NOTE — PROGRESS NOTES
Let her know the labs aren't very helpful to discern why she is feeling bad.  Her blood counts, liver, kidney, thyroid look fine.  Her inflammatory markers are mildly elevated, but not very specific.  Will await results of mri next week.

## 2025-06-03 NOTE — PROGRESS NOTES
Let her know the MRI doesn't show any major changes compared to her last MRI.  She has some abnormalities that the radiologist thinks could be from microvascular changes (blockage in the small vessels of the brain that cause changes in the brain tissue).  This has been present since 2023 and hasn't really changed.  Is she feeling any better?

## 2025-07-23 ENCOUNTER — OFFICE VISIT (OUTPATIENT)
Dept: URGENT CARE | Facility: CLINIC | Age: 80
End: 2025-07-23
Payer: MEDICARE

## 2025-07-23 VITALS
HEIGHT: 62 IN | SYSTOLIC BLOOD PRESSURE: 134 MMHG | RESPIRATION RATE: 18 BRPM | HEART RATE: 100 BPM | BODY MASS INDEX: 31.28 KG/M2 | WEIGHT: 170 LBS | TEMPERATURE: 99 F | OXYGEN SATURATION: 98 % | DIASTOLIC BLOOD PRESSURE: 78 MMHG

## 2025-07-23 DIAGNOSIS — R30.0 DYSURIA: ICD-10-CM

## 2025-07-23 DIAGNOSIS — N39.0 URINARY TRACT INFECTION WITHOUT HEMATURIA, SITE UNSPECIFIED: Primary | ICD-10-CM

## 2025-07-23 LAB
BILIRUB UR QL STRIP: NEGATIVE
GLUCOSE UR QL STRIP: NEGATIVE
KETONES UR QL STRIP: NEGATIVE
LEUKOCYTE ESTERASE UR QL STRIP: POSITIVE
PH, POC UA: 5
POC BLOOD, URINE: NEGATIVE
POC NITRATES, URINE: NEGATIVE
PROT UR QL STRIP: NEGATIVE
SP GR UR STRIP: 1.01 (ref 1–1.03)
UROBILINOGEN UR STRIP-ACNC: NORMAL (ref 0.1–1.1)

## 2025-07-23 PROCEDURE — 87086 URINE CULTURE/COLONY COUNT: CPT | Performed by: FAMILY MEDICINE

## 2025-07-23 PROCEDURE — 99213 OFFICE O/P EST LOW 20 MIN: CPT | Mod: ,,, | Performed by: FAMILY MEDICINE

## 2025-07-23 PROCEDURE — 81003 URINALYSIS AUTO W/O SCOPE: CPT | Mod: QW,,, | Performed by: FAMILY MEDICINE

## 2025-07-23 RX ORDER — CYANOCOBALAMIN (VITAMIN B-12) 250 MCG
250 TABLET ORAL DAILY
COMMUNITY

## 2025-07-23 RX ORDER — SULFAMETHOXAZOLE AND TRIMETHOPRIM 800; 160 MG/1; MG/1
1 TABLET ORAL 2 TIMES DAILY
Qty: 14 TABLET | Refills: 0 | Status: SHIPPED | OUTPATIENT
Start: 2025-07-23 | End: 2025-07-30

## 2025-07-23 NOTE — PROGRESS NOTES
"Subjective:      Patient ID: Neeru Watson is a 80 y.o. female.    Vitals:  height is 5' 2" (1.575 m) and weight is 77.1 kg (170 lb). Her tympanic temperature is 98.5 °F (36.9 °C). Her blood pressure is 134/78 and her pulse is 100. Her respiration is 18 and oxygen saturation is 98%.     Chief Complaint: Urinary Frequency     Patient is a 80 y.o. female who presents to urgent care with complaints of urinary frequency, lower back pain (pain level is at a 2), bladder discomfort, possible fever   x 1  week. Alleviating factors include No med(s) taken for symptoms  with mild amount of relief. Patient denies burning within urine, ordor, cloudy urine.     Urinary Frequency   Associated symptoms include frequency and urgency.       Constitution: Negative.   HENT: Negative.     Cardiovascular: Negative.    Eyes: Negative.    Respiratory: Negative.     Gastrointestinal: Negative.    Genitourinary:  Positive for frequency and urgency.   Musculoskeletal: Negative.    Skin: Negative.    Allergic/Immunologic: Negative.    Neurological: Negative.    Hematologic/Lymphatic: Negative.       Objective:     Physical Exam   Constitutional: She is oriented to person, place, and time. She appears well-developed. She is cooperative.  Non-toxic appearance. She does not appear ill. No distress.   HENT:   Head: Normocephalic and atraumatic.   Eyes: Conjunctivae and lids are normal.   Neck: Trachea normal and phonation normal. Neck supple.   Pulmonary/Chest: Effort normal. No respiratory distress.   Abdominal: Normal appearance. Soft. There is no abdominal tenderness. There is no rebound, no guarding, no left CVA tenderness and no right CVA tenderness.   Neurological: She is alert and oriented to person, place, and time. She exhibits normal muscle tone.   Skin: Skin is warm, dry, intact and not diaphoretic.   Psychiatric: Her speech is normal and behavior is normal. Mood, judgment and thought content normal.   Nursing note and vitals " "reviewed.         Previous History      Review of patient's allergies indicates:   Allergen Reactions    Hydrochlorothiazide Rash    Ketoconazole Rash       Past Medical History:   Diagnosis Date    Acute appendicitis with localized peritonitis, without perforation, abscess, or gangrene 12/10/2022    Formatting of this note might be different from the original. Added automatically from request for surgery [id number]  Last Assessment & Plan:  Formatting of this note might be different from the original. 1.5 weeks s/p lap appy  - pathology reviewed- c/w diagnosis - RUE ultrasound ordered for arm swelling - will call with results - RTC prn    Hypertension     Hyponatremia 01/20/2023    Pleuritic chest pain 01/19/2023    Skin lesion 01/31/2024    She had a skin cancer removed from her nose.       Current Outpatient Medications   Medication Instructions    clotrimazole-betamethasone 1-0.05% (LOTRISONE) cream Topical (Top), 2 times daily PRN    cyanocobalamin (VITAMIN B-12) 250 mcg, Daily    ELIQUIS 5 mg Tab TAKE 1 TABLET BY MOUTH TWICE DAILY start in 1 week after completing loading dose    lisinopriL 10 MG tablet TAKE 1 TABLET BY MOUTH EVERY DAY    sulfamethoxazole-trimethoprim 800-160mg (BACTRIM DS) 800-160 mg Tab 1 tablet, Oral, 2 times daily    vitamin D (VITAMIN D3) 1,000 Units, Daily     Past Surgical History:   Procedure Laterality Date    APPENDECTOMY  12/10/2022    BREAST LUMPECTOMY      KNEE ARTHROSCOPY      LIPOMA RESECTION       Family History   Problem Relation Name Age of Onset    Liver cancer Mother      Heart disease Father      Hypertension Sister         Social History[1]     Physical Exam      Vital Signs Reviewed   /78   Pulse 100   Temp 98.5 °F (36.9 °C) (Tympanic)   Resp 18   Ht 5' 2" (1.575 m)   Wt 77.1 kg (170 lb)   SpO2 98%   BMI 31.09 kg/m²        Procedures    Procedures     Labs     Results for orders placed or performed in visit on 07/23/25   POCT Urinalysis, Dipstick, Automated, W/O " Scope    Collection Time: 07/23/25  4:11 PM   Result Value Ref Range    POC Blood, Urine Negative Negative    POC Bilirubin, Urine Negative Negative    POC Urobilinogen, Urine NORMAL 0.1 - 1.1    POC Ketones, Urine Negative Negative    POC Protein, Urine Negative Negative    POC Nitrates, Urine Negative Negative    POC Glucose, Urine Negative Negative    pH, UA 5     POC Specific Gravity, Urine 1.010 1.003 - 1.029    POC Leukocytes, Urine Positive (A) Negative       Assessment:     1. Urinary tract infection without hematuria, site unspecified    2. Dysuria        Plan:   Medication sent to pharmacy.  We will culture urine and call you with the results when they become available.  Monitor for fever.  Hydrate.  If your symptoms persist or worsen or you develop fever back pain or belly pain return to clinic or seek medical attention immediately      Urinary tract infection without hematuria, site unspecified  -     Urine Culture High Risk ($$)    Dysuria  -     POCT Urinalysis, Dipstick, Automated, W/O Scope    Other orders  -     sulfamethoxazole-trimethoprim 800-160mg (BACTRIM DS) 800-160 mg Tab; Take 1 tablet by mouth 2 (two) times daily. for 7 days  Dispense: 14 tablet; Refill: 0                         [1]   Social History  Tobacco Use    Smoking status: Never     Passive exposure: Never    Smokeless tobacco: Never   Substance Use Topics    Alcohol use: Not Currently    Drug use: Never

## 2025-07-24 ENCOUNTER — RESULTS FOLLOW-UP (OUTPATIENT)
Dept: URGENT CARE | Facility: CLINIC | Age: 80
End: 2025-07-24
Payer: MEDICARE

## 2025-07-25 LAB — BACTERIA UR CULT: NORMAL

## 2025-08-03 ENCOUNTER — HOSPITAL ENCOUNTER (EMERGENCY)
Facility: HOSPITAL | Age: 80
Discharge: HOME | End: 2025-08-03
Attending: EMERGENCY MEDICINE
Payer: MEDICARE

## 2025-08-03 VITALS
RESPIRATION RATE: 22 BRPM | SYSTOLIC BLOOD PRESSURE: 153 MMHG | HEIGHT: 62 IN | WEIGHT: 180 LBS | TEMPERATURE: 98 F | OXYGEN SATURATION: 98 % | BODY MASS INDEX: 33.13 KG/M2 | HEART RATE: 76 BPM | DIASTOLIC BLOOD PRESSURE: 75 MMHG

## 2025-08-03 DIAGNOSIS — R52 PAIN: ICD-10-CM

## 2025-08-03 DIAGNOSIS — M54.9 BACK PAIN, UNSPECIFIED BACK LOCATION, UNSPECIFIED BACK PAIN LATERALITY, UNSPECIFIED CHRONICITY: Primary | ICD-10-CM

## 2025-08-03 DIAGNOSIS — R06.02 SOB (SHORTNESS OF BREATH): ICD-10-CM

## 2025-08-03 LAB
ALBUMIN SERPL-MCNC: 3.9 G/DL (ref 3.4–4.8)
ALBUMIN/GLOB SERPL: 0.8 RATIO (ref 1.1–2)
ALP SERPL-CCNC: 118 UNIT/L (ref 40–150)
ALT SERPL-CCNC: 57 UNIT/L (ref 0–55)
ANION GAP SERPL CALC-SCNC: 15 MEQ/L
AST SERPL-CCNC: 51 UNIT/L (ref 11–45)
BASOPHILS # BLD AUTO: 0.05 X10(3)/MCL
BASOPHILS NFR BLD AUTO: 0.6 %
BILIRUB SERPL-MCNC: 0.6 MG/DL
BUN SERPL-MCNC: 16.1 MG/DL (ref 9.8–20.1)
CALCIUM SERPL-MCNC: 9.6 MG/DL (ref 8.4–10.2)
CHLORIDE SERPL-SCNC: 109 MMOL/L (ref 98–107)
CO2 SERPL-SCNC: 17 MMOL/L (ref 23–31)
CREAT SERPL-MCNC: 0.97 MG/DL (ref 0.55–1.02)
CREAT/UREA NIT SERPL: 17
EOSINOPHIL # BLD AUTO: 0.01 X10(3)/MCL (ref 0–0.9)
EOSINOPHIL NFR BLD AUTO: 0.1 %
ERYTHROCYTE [DISTWIDTH] IN BLOOD BY AUTOMATED COUNT: 13.4 % (ref 11.5–17)
GFR SERPLBLD CREATININE-BSD FMLA CKD-EPI: 59 ML/MIN/1.73/M2
GLOBULIN SER-MCNC: 4.7 GM/DL (ref 2.4–3.5)
GLUCOSE SERPL-MCNC: 108 MG/DL (ref 82–115)
HCT VFR BLD AUTO: 44.9 % (ref 37–47)
HGB BLD-MCNC: 15 G/DL (ref 12–16)
IMM GRANULOCYTES # BLD AUTO: 0.03 X10(3)/MCL (ref 0–0.04)
IMM GRANULOCYTES NFR BLD AUTO: 0.4 %
INR PPP: 1.5
LYMPHOCYTES # BLD AUTO: 1.37 X10(3)/MCL (ref 0.6–4.6)
LYMPHOCYTES NFR BLD AUTO: 16.3 %
MCH RBC QN AUTO: 33 PG (ref 27–31)
MCHC RBC AUTO-ENTMCNC: 33.4 G/DL (ref 33–36)
MCV RBC AUTO: 98.9 FL (ref 80–94)
MONOCYTES # BLD AUTO: 0.4 X10(3)/MCL (ref 0.1–1.3)
MONOCYTES NFR BLD AUTO: 4.8 %
NEUTROPHILS # BLD AUTO: 6.52 X10(3)/MCL (ref 2.1–9.2)
NEUTROPHILS NFR BLD AUTO: 77.8 %
NRBC BLD AUTO-RTO: 0 %
NT-PROBNP SERPL-MCNC: 158 PG/ML
OHS QRS DURATION: 70 MS
OHS QTC CALCULATION: 438 MS
PLATELET # BLD AUTO: 260 X10(3)/MCL (ref 130–400)
PMV BLD AUTO: 11.6 FL (ref 7.4–10.4)
POTASSIUM SERPL-SCNC: 4.6 MMOL/L (ref 3.5–5.1)
PROT SERPL-MCNC: 8.6 GM/DL (ref 5.8–7.6)
PROTHROMBIN TIME: 18.2 SECONDS (ref 12.5–14.5)
RBC # BLD AUTO: 4.54 X10(6)/MCL (ref 4.2–5.4)
SODIUM SERPL-SCNC: 141 MMOL/L (ref 136–145)
TROPONIN I SERPL HS-MCNC: 4 NG/L
WBC # BLD AUTO: 8.38 X10(3)/MCL (ref 4.5–11.5)

## 2025-08-03 PROCEDURE — 83880 ASSAY OF NATRIURETIC PEPTIDE: CPT

## 2025-08-03 PROCEDURE — 85025 COMPLETE CBC W/AUTO DIFF WBC: CPT | Performed by: NURSE PRACTITIONER

## 2025-08-03 PROCEDURE — 99285 EMERGENCY DEPT VISIT HI MDM: CPT | Mod: 25

## 2025-08-03 PROCEDURE — 80053 COMPREHEN METABOLIC PANEL: CPT | Performed by: NURSE PRACTITIONER

## 2025-08-03 PROCEDURE — 96360 HYDRATION IV INFUSION INIT: CPT

## 2025-08-03 PROCEDURE — 63600175 PHARM REV CODE 636 W HCPCS

## 2025-08-03 PROCEDURE — 93005 ELECTROCARDIOGRAM TRACING: CPT

## 2025-08-03 PROCEDURE — 84484 ASSAY OF TROPONIN QUANT: CPT

## 2025-08-03 PROCEDURE — 25500020 PHARM REV CODE 255: Performed by: EMERGENCY MEDICINE

## 2025-08-03 PROCEDURE — 85610 PROTHROMBIN TIME: CPT | Performed by: NURSE PRACTITIONER

## 2025-08-03 PROCEDURE — 93010 ELECTROCARDIOGRAM REPORT: CPT | Mod: ,,, | Performed by: INTERNAL MEDICINE

## 2025-08-03 RX ORDER — TIZANIDINE 4 MG/1
4 TABLET ORAL NIGHTLY PRN
Qty: 10 TABLET | Refills: 0 | Status: SHIPPED | OUTPATIENT
Start: 2025-08-03 | End: 2025-08-13

## 2025-08-03 RX ADMIN — SODIUM CHLORIDE, POTASSIUM CHLORIDE, SODIUM LACTATE AND CALCIUM CHLORIDE 1000 ML: 600; 310; 30; 20 INJECTION, SOLUTION INTRAVENOUS at 03:08

## 2025-08-03 RX ADMIN — IOHEXOL 67 ML: 350 INJECTION, SOLUTION INTRAVENOUS at 02:08

## 2025-08-03 NOTE — ED PROVIDER NOTES
Attestation Note:    Patient seen and examined.  She presents with upper back pain that feels similar to when she was diagnosed with a PE.  She remains on Eliquis and states she has not missed any doses.  She does exhibit some paraspinal tenderness, but otherwise has an unremarkable exam.  Denies CP or SOB.  SpO2 normal on room air.  Will obtain cardiac work-up and CTA chest.  If all negative, will treat for MSK pain.    I am in agreement with Dr. Milian's documentation and plan of care and I assume responsibility for said plan.     Chong Riley MD  08/03/25 2316

## 2025-08-03 NOTE — FIRST PROVIDER EVALUATION
"Medical screening examination initiated.  I have conducted a focused provider triage encounter, findings are as follows:    Brief history of present illness:  80-year-old female with a history of blood clots who is currently on Eliquis 5 mg twice a day is complaining of feeling heavy with right upper back pain.  Denies any chest pain and shortness of breath.    Vitals:    08/03/25 1101   BP: (!) 177/78   Pulse: 100   Resp: 18   Temp: 97.8 °F (36.6 °C)   SpO2: 99%   Weight: 81.6 kg (180 lb)   Height: 5' 2" (1.575 m)       Pertinent physical exam:  AAOx3    Brief workup plan:  labs    Preliminary workup initiated; this workup will be continued and followed by the physician or advanced practice provider that is assigned to the patient when roomed.  "

## 2025-08-03 NOTE — ED PROVIDER NOTES
"Encounter Date: 8/3/2025       History     Chief Complaint   Patient presents with    Back Pain     Right upper back pain, denies chest pain or sob, or heavy lifting. Pt reports her "lung feels heavy". Pt takes eliquis.pt reports it feels similar to last blood clot in her lung.     HPI    Neeru Watson 80 y.o. female with pmhx of  HTN and previous PE on Eliquis presents to the ED for right upper back pain for 3 days. Reports similar pain to previous PE. Reports adherent to Eliquis and has not missed a dose. Reports sedentary lifestyle but no immobilization or hemoptysis. Reports pain was worse on onset Friday morning but has improved. Denies recent fall, trauma, or accidents. Denies fever, n/v, CP, acute SOB, abdominal pain, dysuria, or BLE swelling.       Review of patient's allergies indicates:   Allergen Reactions    Hydrochlorothiazide Rash    Ketoconazole Rash     Past Medical History:   Diagnosis Date    Acute appendicitis with localized peritonitis, without perforation, abscess, or gangrene 12/10/2022    Formatting of this note might be different from the original. Added automatically from request for surgery [id number]  Last Assessment & Plan:  Formatting of this note might be different from the original. 1.5 weeks s/p lap appy  - pathology reviewed- c/w diagnosis - RUE ultrasound ordered for arm swelling - will call with results - RTC prn    Hypertension     Hyponatremia 01/20/2023    Pleuritic chest pain 01/19/2023    Skin lesion 01/31/2024    She had a skin cancer removed from her nose.       Past Surgical History:   Procedure Laterality Date    APPENDECTOMY  12/10/2022    BREAST LUMPECTOMY      KNEE ARTHROSCOPY      LIPOMA RESECTION       Family History   Problem Relation Name Age of Onset    Liver cancer Mother      Heart disease Father      Hypertension Sister       Social History[1]  Review of Systems   Constitutional:  Negative for activity change, appetite change, fatigue and fever.   Eyes:  Negative " for visual disturbance.   Respiratory:  Negative for cough, shortness of breath and wheezing.    Cardiovascular:  Negative for chest pain and leg swelling.   Gastrointestinal:  Negative for abdominal pain, nausea and vomiting.   Genitourinary:  Negative for decreased urine volume and dysuria.   Neurological:  Negative for dizziness, weakness and headaches.       Physical Exam     Initial Vitals [08/03/25 1101]   BP Pulse Resp Temp SpO2   (!) 177/78 100 18 97.8 °F (36.6 °C) 99 %      MAP       --         Physical Exam    Nursing note and vitals reviewed.  Constitutional: She appears well-developed and well-nourished. No distress.   HENT:   Head: Normocephalic and atraumatic.   Eyes: Pupils are equal, round, and reactive to light.   Neck: No JVD present.   Normal range of motion.  Cardiovascular:  Normal rate, regular rhythm and normal heart sounds.           No murmur heard.  2+ bilateral radial and DP pulses    Pulmonary/Chest: Breath sounds normal. No respiratory distress. She has no wheezes. She has no rhonchi. She has no rales.   No pain on deep inspiration   Abdominal: Abdomen is soft. Bowel sounds are normal. She exhibits no distension. There is no abdominal tenderness.   Negative shah sign   Musculoskeletal:         General: No edema.      Cervical back: Normal range of motion.      Comments: No BLE edema  Reproducible tenderness over right mid paraspinal muscles on palpation, no erythema or bruising over area     Lymphadenopathy:     She has no cervical adenopathy.   Neurological: She is alert and oriented to person, place, and time. GCS score is 15. GCS eye subscore is 4. GCS verbal subscore is 5. GCS motor subscore is 6.   Skin: Skin is warm and dry. Capillary refill takes less than 2 seconds. No erythema.         ED Course   Procedures  Labs Reviewed   COMPREHENSIVE METABOLIC PANEL - Abnormal       Result Value    Sodium 141      Potassium 4.6      Chloride 109 (*)     CO2 17 (*)     Glucose 108       Blood Urea Nitrogen 16.1      Creatinine 0.97      Calcium 9.6      Protein Total 8.6 (*)     Albumin 3.9      Globulin 4.7 (*)     Albumin/Globulin Ratio 0.8 (*)     Bilirubin Total 0.6            ALT 57 (*)     AST 51 (*)     eGFR 59      Anion Gap 15.0      BUN/Creatinine Ratio 17     PROTIME-INR - Abnormal    PT 18.2 (*)     INR 1.5 (*)     Narrative:     Protimes are used to monitor anticoagulant agents such as warfarin. PT INR values are based on the current patient normal mean and the JACOBO value for the specific instrument reagent used.  **Routine theraputic target values for the INR are 2.0-3.0**   CBC WITH DIFFERENTIAL - Abnormal    WBC 8.38      RBC 4.54      Hgb 15.0      Hct 44.9      MCV 98.9 (*)     MCH 33.0 (*)     MCHC 33.4      RDW 13.4      Platelet 260      MPV 11.6 (*)     Neut % 77.8      Lymph % 16.3      Mono % 4.8      Eos % 0.1      Basophil % 0.6      Imm Grans % 0.4      Neut # 6.52      Lymph # 1.37      Mono # 0.40      Eos # 0.01      Baso # 0.05      Imm Gran # 0.03      NRBC% 0.0     TROPONIN I HIGH SENSITIVITY - Normal    Troponin High Sensitive 4     NT-PRO NATRIURETIC PEPTIDE - Normal    NT-proBNP 158      Narrative:     NOTE:  Access complete set of age - and/or gender-specific reference intervals for this test in the [facility] Collection Manual.   CBC W/ AUTO DIFFERENTIAL    Narrative:     The following orders were created for panel order CBC auto differential.  Procedure                               Abnormality         Status                     ---------                               -----------         ------                     CBC with Differential[[id number]]       Abnormal            Final result                 Please view results for these tests on the individual orders.     EKG Readings: (Independently Interpreted)   Initial Reading: No STEMI. Rhythm: Normal Sinus Rhythm. Heart Rate: 80. Conduction: Normal. Axis: Normal.       Imaging Results               CTA Chest Non-Coronary (PE Studies) (Final result)  Result time 08/03/25 14:24:11      Final result by Chong Hoang MD (08/03/25 14:24:11)                   Impression:      1. No pulmonary embolus.  Interval resolution of pulmonary thrombus since chest CT on 10/01/2023.  2. No evidence of acute cardiopulmonary abnormality.  3. 6 mm right upper lobe pulmonary nodule, stable since 10/01/2023.      Electronically signed by: Chong Hoang MD  Date:    08/03/2025  Time:    14:24               Narrative:    EXAMINATION:  CTA CHEST NON CORONARY (PE STUDIES)    CLINICAL HISTORY:  Patient seen and examined.  She presents with upper back pain that feels similar to when she was diagnosed with a PE.  She remains on Eliquis and states she has not missed any doses.  She does exhibit some paraspinal tenderness, but otherwise has an unremarkable exam.  Denies CP or SOB.  SpO2 normal on room air.  Will obtain cardiac work-up and CTA chest.  If all negative, will treat for MSK pain.    TECHNIQUE:  Axial CT images were obtained through the chest after IV administration of contrast. 67 mL Omnipaque 350. MIP, coronal and sagittal reconstructions submitted and interpreted.  Automated exposure control, dose radiation lowering technique, was utilized.    COMPARISON:  Chest CTA from 10/01/2023    FINDINGS:  Unremarkable thoracic inlet.  Heart size is within normal limits.  No pulmonary emboli.  Interval resolution of pulmonary thrombus seen on 10/01/2023.  Central pulmonary arteries appear within normal limits.    No axillary, mediastinal or perihilar lymphadenopathy.  Small hiatal hernia is present.  There is scattered dependent atelectasis.  No effusion.  No pneumothorax.  6 mm right upper lobe noncalcified pulmonary nodule is unchanged since 10/01/2023.  No lung mass.    Partially visualized solid abdominal viscera appear normal.  Chronic appearing left-sided rib fracture deformities are present.  No suspicious  osteolytic or osteoblastic lesion.  There is a chronic compression deformity through the inferior endplate of T10, unchanged since 03/06/2024.                                       X-Ray Chest PA And Lateral (Final result)  Result time 08/03/25 12:57:51      Final result by Jose M Godinez MD (08/03/25 12:57:51)                   Impression:      No acute cardiopulmonary abnormality.      Electronically signed by: Jose M Godinez  Date:    08/03/2025  Time:    12:57               Narrative:    EXAMINATION:  XR CHEST PA AND LATERAL    CLINICAL HISTORY:  Pain, unspecified    COMPARISON:  1 October 2023    FINDINGS:  Frontal and lateral views of the chest were obtained. The heart is not significantly enlarged.  The lungs are clear.  No pneumothorax or sizable pleural effusion.                                    X-Rays:   Independently Interpreted Readings:   Other Readings:  No acute cardiopulmonary findings, no pneumothorax, pleural effusion, or area of consilidation    Medications   lactated ringers bolus 1,000 mL (has no administration in time range)   iohexoL (OMNIPAQUE 350) injection 67 mL (67 mLs Intravenous Given 8/3/25 1412)     Medical Decision Making  80 y.o. female with pmhx of HTN and previous PE on eliquis 5mg BID reports for right upper back pain for 3 days. Reports similar pain to previous PE. Reports sedentary lifestyle, but no immobilization or recent surgery. Denies trauma, fall, or accidents to injury back. Has not missed any doses of eliquis. No pleuritic chest pain, physical exam mostly benign except for reproducible right paraspinal back pain to palpation.     Labs obtained with normal BNP and Troponin, CBC with no acute abnormalities, CMP with decreased bicarb of 17, will give 1 L LR bolus. Wells score 1.5, but given hx of previous PE will obtain CTA PE study. Chest x-ray with no acute findings, EKG with normal sinus with no STEMI. CTA PE study negative for PE. Likely MSK back pain, will prescribe  Tizanidine 4mg nightly PRN, patient can take tylenol for pain, avoid NSAIDs due to eliquis use. Patient verbalized understanding, stable for discharge    Amount and/or Complexity of Data Reviewed  Labs: ordered. Decision-making details documented in ED Course.  Radiology: ordered. Decision-making details documented in ED Course.    Risk  Prescription drug management.      Additional MDM:   Differential Diagnosis:   Pulmonary embolism, pneumonia, pneumothorax, paraspinal muscle strain, MI, pleural effusion       Well's Criteria Score:  -Clinical symptoms of DVT (leg swelling, pain with palpation) = 0.0  -PE is #1 diagnosis OR equally likely =            0.0  -Heart Rate >100 =   0.0  -Immobilization (= or > than 3 days) or surgery in the previous 4 weeks = 0.0  -Previous DVT/PE = 1.5  -Hemoptysis =          0.0  -Malignancy =           0.0  Well's Probability Score =    1.5                                         Clinical Impression:  Final diagnoses:  [R06.02] SOB (shortness of breath)  [M54.9] Back pain, unspecified back location, unspecified back pain laterality, unspecified chronicity (Primary)          ED Disposition Condition    Discharge Stable          ED Prescriptions       Medication Sig Dispense Start Date End Date Auth. Provider    tiZANidine (ZANAFLEX) 4 MG tablet Take 1 tablet (4 mg total) by mouth nightly as needed (back pain). 10 tablet 8/3/2025 8/13/2025 Sharon Milian MD          Follow-up Information       Follow up With Specialties Details Why Contact Info    Melanie Manrique MD Internal Medicine Go in 1 week  [address] [facility] [address]  [phone]      [facility] - Emergency Dept Emergency Medicine Go to  If symptoms worsen, As needed [address] [facility] [address] [phone]          Patient and plan discussed with MD Sharon Laguna MD  [facility] HO-2           [1]   Social  History  Tobacco Use    Smoking status: Never     Passive exposure: Never    Smokeless tobacco: Never   Substance Use Topics    Alcohol use: Not Currently    Drug use: Never        Sharon Milian MD  Resident  08/03/25 4104

## 2025-08-06 ENCOUNTER — TELEPHONE (OUTPATIENT)
Dept: INTERNAL MEDICINE | Facility: CLINIC | Age: 80
End: 2025-08-06
Payer: MEDICARE

## 2025-08-06 NOTE — TELEPHONE ENCOUNTER
----- Message from Nurse Odonnell sent at 8/6/2025  1:31 PM CDT -----  Regarding: PV Dr. Manrique 8/13/25 @9:20a  Are there any outstanding tasks in the chart? Patient will need fasting labs    Is there any documentation of tasks? no    Please tell patient to bring living will, power of , or advance directive document to visit if they have it.  ----- Message -----  From: Belle Salazar LPN  Sent: 8/6/2025   1:32 PM CDT  To: Mariela Hemphill, Patient Care Assistant  Subject: GODWIN Manrique 8/13/25 @9:20a                       Are there any outstanding tasks in the chart? Patient will need fasting labs    Is there any documentation of tasks? no    Please tell patient to bring living will, power of , or advance directive document to visit if they have it.

## 2025-08-11 ENCOUNTER — LAB VISIT (OUTPATIENT)
Dept: LAB | Facility: HOSPITAL | Age: 80
End: 2025-08-11
Attending: INTERNAL MEDICINE
Payer: MEDICARE

## 2025-08-11 DIAGNOSIS — E55.9 VITAMIN D DEFICIENCY: ICD-10-CM

## 2025-08-11 DIAGNOSIS — R53.83 FATIGUE, UNSPECIFIED TYPE: ICD-10-CM

## 2025-08-11 DIAGNOSIS — R53.81 MALAISE: ICD-10-CM

## 2025-08-11 DIAGNOSIS — I10 PRIMARY HYPERTENSION: ICD-10-CM

## 2025-08-11 LAB
25(OH)D3+25(OH)D2 SERPL-MCNC: 65 NG/ML (ref 30–80)
ALBUMIN SERPL-MCNC: 3.6 G/DL (ref 3.4–4.8)
ALBUMIN/GLOB SERPL: 1.1 RATIO (ref 1.1–2)
ALP SERPL-CCNC: 103 UNIT/L (ref 40–150)
ALT SERPL-CCNC: 18 UNIT/L (ref 0–55)
ANION GAP SERPL CALC-SCNC: 9 MEQ/L
AST SERPL-CCNC: 20 UNIT/L (ref 11–45)
BACTERIA #/AREA URNS AUTO: ABNORMAL /HPF
BASOPHILS # BLD AUTO: 0.05 X10(3)/MCL
BASOPHILS NFR BLD AUTO: 0.6 %
BILIRUB SERPL-MCNC: 0.7 MG/DL
BILIRUB UR QL STRIP.AUTO: NEGATIVE
BUN SERPL-MCNC: 9.8 MG/DL (ref 9.8–20.1)
CALCIUM SERPL-MCNC: 9 MG/DL (ref 8.4–10.2)
CHLORIDE SERPL-SCNC: 111 MMOL/L (ref 98–107)
CHOLEST SERPL-MCNC: 206 MG/DL
CHOLEST/HDLC SERPL: 3 {RATIO} (ref 0–5)
CLARITY UR: ABNORMAL
CO2 SERPL-SCNC: 24 MMOL/L (ref 23–31)
COLOR UR AUTO: YELLOW
CREAT SERPL-MCNC: 0.93 MG/DL (ref 0.55–1.02)
CREAT/UREA NIT SERPL: 11
EOSINOPHIL # BLD AUTO: 0.04 X10(3)/MCL (ref 0–0.9)
EOSINOPHIL NFR BLD AUTO: 0.5 %
EPI CELLS #/AREA URNS HPF: ABNORMAL /HPF
ERYTHROCYTE [DISTWIDTH] IN BLOOD BY AUTOMATED COUNT: 13.1 % (ref 11.5–17)
GFR SERPLBLD CREATININE-BSD FMLA CKD-EPI: >60 ML/MIN/1.73/M2
GLOBULIN SER-MCNC: 3.4 GM/DL (ref 2.4–3.5)
GLUCOSE SERPL-MCNC: 119 MG/DL (ref 82–115)
GLUCOSE UR QL STRIP: ABNORMAL
HCT VFR BLD AUTO: 44.1 % (ref 37–47)
HDLC SERPL-MCNC: 62 MG/DL (ref 35–60)
HGB BLD-MCNC: 14.5 G/DL (ref 12–16)
HGB UR QL STRIP: ABNORMAL
HYALINE CASTS #/AREA URNS LPF: ABNORMAL /LPF
IMM GRANULOCYTES # BLD AUTO: 0.04 X10(3)/MCL (ref 0–0.04)
IMM GRANULOCYTES NFR BLD AUTO: 0.5 %
KETONES UR QL STRIP: NEGATIVE
LDLC SERPL CALC-MCNC: 117 MG/DL (ref 50–140)
LEUKOCYTE ESTERASE UR QL STRIP: 500
LYMPHOCYTES # BLD AUTO: 2.22 X10(3)/MCL (ref 0.6–4.6)
LYMPHOCYTES NFR BLD AUTO: 28.5 %
MCH RBC QN AUTO: 33 PG (ref 27–31)
MCHC RBC AUTO-ENTMCNC: 32.9 G/DL (ref 33–36)
MCV RBC AUTO: 100.2 FL (ref 80–94)
MONOCYTES # BLD AUTO: 0.41 X10(3)/MCL (ref 0.1–1.3)
MONOCYTES NFR BLD AUTO: 5.3 %
MUCOUS THREADS URNS QL MICRO: ABNORMAL /LPF
NEUTROPHILS # BLD AUTO: 5.02 X10(3)/MCL (ref 2.1–9.2)
NEUTROPHILS NFR BLD AUTO: 64.6 %
NITRITE UR QL STRIP: NEGATIVE
NRBC BLD AUTO-RTO: 0 %
PH UR STRIP: 6 [PH]
PLATELET # BLD AUTO: 270 X10(3)/MCL (ref 130–400)
PMV BLD AUTO: 11 FL (ref 7.4–10.4)
POTASSIUM SERPL-SCNC: 4.4 MMOL/L (ref 3.5–5.1)
PROT SERPL-MCNC: 7 GM/DL (ref 5.8–7.6)
PROT UR QL STRIP: ABNORMAL
RBC # BLD AUTO: 4.4 X10(6)/MCL (ref 4.2–5.4)
RBC #/AREA URNS AUTO: ABNORMAL /HPF
SODIUM SERPL-SCNC: 144 MMOL/L (ref 136–145)
SP GR UR STRIP.AUTO: 1.02 (ref 1–1.03)
SQUAMOUS #/AREA URNS LPF: ABNORMAL /HPF
TRIGL SERPL-MCNC: 134 MG/DL (ref 37–140)
TSH SERPL-ACNC: 4.79 UIU/ML (ref 0.35–4.94)
UROBILINOGEN UR STRIP-ACNC: NORMAL
VLDLC SERPL CALC-MCNC: 27 MG/DL
WBC # BLD AUTO: 7.78 X10(3)/MCL (ref 4.5–11.5)
WBC #/AREA URNS AUTO: >100 /HPF
WBC CLUMPS UR QL AUTO: ABNORMAL

## 2025-08-11 PROCEDURE — 82306 VITAMIN D 25 HYDROXY: CPT

## 2025-08-11 PROCEDURE — 80053 COMPREHEN METABOLIC PANEL: CPT

## 2025-08-11 PROCEDURE — 81015 MICROSCOPIC EXAM OF URINE: CPT

## 2025-08-11 PROCEDURE — 80061 LIPID PANEL: CPT

## 2025-08-11 PROCEDURE — 84443 ASSAY THYROID STIM HORMONE: CPT

## 2025-08-11 PROCEDURE — 36415 COLL VENOUS BLD VENIPUNCTURE: CPT

## 2025-08-11 PROCEDURE — 85025 COMPLETE CBC W/AUTO DIFF WBC: CPT

## 2025-08-11 PROCEDURE — 87077 CULTURE AEROBIC IDENTIFY: CPT | Mod: 91

## 2025-08-13 ENCOUNTER — OFFICE VISIT (OUTPATIENT)
Dept: INTERNAL MEDICINE | Facility: CLINIC | Age: 80
End: 2025-08-13
Payer: MEDICARE

## 2025-08-13 VITALS
DIASTOLIC BLOOD PRESSURE: 85 MMHG | WEIGHT: 174 LBS | RESPIRATION RATE: 18 BRPM | SYSTOLIC BLOOD PRESSURE: 135 MMHG | HEIGHT: 62 IN | HEART RATE: 83 BPM | BODY MASS INDEX: 32.02 KG/M2 | OXYGEN SATURATION: 98 %

## 2025-08-13 DIAGNOSIS — I10 PRIMARY HYPERTENSION: Primary | ICD-10-CM

## 2025-08-13 DIAGNOSIS — N30.00 ACUTE CYSTITIS WITHOUT HEMATURIA: ICD-10-CM

## 2025-08-13 DIAGNOSIS — H53.8 BLURRY VISION: ICD-10-CM

## 2025-08-13 DIAGNOSIS — E55.9 VITAMIN D DEFICIENCY: ICD-10-CM

## 2025-08-13 DIAGNOSIS — Z86.711 HISTORY OF PULMONARY EMBOLISM: ICD-10-CM

## 2025-08-13 PROBLEM — N39.0 UTI (URINARY TRACT INFECTION): Status: ACTIVE | Noted: 2025-08-13

## 2025-08-13 PROBLEM — R53.81 MALAISE: Status: RESOLVED | Noted: 2025-05-26 | Resolved: 2025-08-13

## 2025-08-13 PROCEDURE — 99214 OFFICE O/P EST MOD 30 MIN: CPT | Mod: ,,, | Performed by: INTERNAL MEDICINE

## 2025-08-14 LAB
BACTERIA UR CULT: ABNORMAL
BACTERIA UR CULT: ABNORMAL